# Patient Record
Sex: FEMALE | Race: WHITE | NOT HISPANIC OR LATINO | URBAN - METROPOLITAN AREA
[De-identification: names, ages, dates, MRNs, and addresses within clinical notes are randomized per-mention and may not be internally consistent; named-entity substitution may affect disease eponyms.]

---

## 2017-07-03 ENCOUNTER — EMERGENCY (EMERGENCY)
Facility: HOSPITAL | Age: 47
LOS: 1 days | Discharge: HOME | End: 2017-07-03
Attending: EMERGENCY MEDICINE
Payer: MEDICAID

## 2017-07-03 VITALS
OXYGEN SATURATION: 98 % | TEMPERATURE: 98 F | SYSTOLIC BLOOD PRESSURE: 107 MMHG | RESPIRATION RATE: 16 BRPM | HEIGHT: 63 IN | DIASTOLIC BLOOD PRESSURE: 57 MMHG | WEIGHT: 151.02 LBS | HEART RATE: 71 BPM
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
## 2017-07-03 DIAGNOSIS — Z86.79 PERSONAL HISTORY OF OTHER DISEASES OF THE CIRCULATORY SYSTEM: ICD-10-CM

## 2017-07-03 DIAGNOSIS — S70.11XA CONTUSION OF RIGHT THIGH, INITIAL ENCOUNTER: ICD-10-CM
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
## 2017-07-03 DIAGNOSIS — Z86.718 PERSONAL HISTORY OF OTHER VENOUS THROMBOSIS AND EMBOLISM: ICD-10-CM

## 2017-07-03 PROCEDURE — 99283 EMERGENCY DEPT VISIT LOW MDM: CPT | Mod: 25

## 2017-07-03 NOTE — ED PROVIDER NOTE - OBJECTIVE STATEMENT
45 y/o F with PMHx of DVT (due to being bedridden after being treated for peritonitis) presents to ED c/o sudden swelling to back of R thigh. Pt noticed the swelling and pain today, and on her way to ED, the lump began to look like a bruise in appearance. Pt notes history of varicose vein nearby, on inner R thigh, s/p vein ablation last year. Pt states that she has felt pain and burning here regularly. Denies any other swelling, calf pain, shortness of breath or any other complaints. NKDA.

## 2017-07-04 LAB
ALBUMIN SERPL ELPH-MCNC: 3.8 G/DL — SIGNIFICANT CHANGE UP (ref 3.5–5)
ALP SERPL-CCNC: 60 U/L — SIGNIFICANT CHANGE UP (ref 40–120)
ALT FLD-CCNC: 18 U/L DA — SIGNIFICANT CHANGE UP (ref 10–60)
ANION GAP SERPL CALC-SCNC: 5 MMOL/L — SIGNIFICANT CHANGE UP (ref 5–17)
APTT BLD: 31.2 SEC — SIGNIFICANT CHANGE UP (ref 27.5–37.4)
AST SERPL-CCNC: 13 U/L — SIGNIFICANT CHANGE UP (ref 10–40)
BILIRUB SERPL-MCNC: 0.3 MG/DL — SIGNIFICANT CHANGE UP (ref 0.2–1.2)
BUN SERPL-MCNC: 21 MG/DL — HIGH (ref 7–18)
CALCIUM SERPL-MCNC: 8.7 MG/DL — SIGNIFICANT CHANGE UP (ref 8.4–10.5)
CHLORIDE SERPL-SCNC: 104 MMOL/L — SIGNIFICANT CHANGE UP (ref 96–108)
CO2 SERPL-SCNC: 31 MMOL/L — SIGNIFICANT CHANGE UP (ref 22–31)
CREAT SERPL-MCNC: 0.84 MG/DL — SIGNIFICANT CHANGE UP (ref 0.5–1.3)
D DIMER BLD IA.RAPID-MCNC: <150 NG/ML DDU — SIGNIFICANT CHANGE UP
GLUCOSE SERPL-MCNC: 109 MG/DL — HIGH (ref 70–99)
HCG SERPL-ACNC: 3 MIU/ML — SIGNIFICANT CHANGE UP
HCT VFR BLD CALC: 37.9 % — SIGNIFICANT CHANGE UP (ref 34.5–45)
HGB BLD-MCNC: 12.6 G/DL — SIGNIFICANT CHANGE UP (ref 11.5–15.5)
INR BLD: 1.08 RATIO — SIGNIFICANT CHANGE UP (ref 0.88–1.16)
MCHC RBC-ENTMCNC: 31.5 PG — SIGNIFICANT CHANGE UP (ref 27–34)
MCHC RBC-ENTMCNC: 33.2 GM/DL — SIGNIFICANT CHANGE UP (ref 32–36)
MCV RBC AUTO: 94.9 FL — SIGNIFICANT CHANGE UP (ref 80–100)
PLATELET # BLD AUTO: 230 K/UL — SIGNIFICANT CHANGE UP (ref 150–400)
POTASSIUM SERPL-MCNC: 3.9 MMOL/L — SIGNIFICANT CHANGE UP (ref 3.5–5.3)
POTASSIUM SERPL-SCNC: 3.9 MMOL/L — SIGNIFICANT CHANGE UP (ref 3.5–5.3)
PROT SERPL-MCNC: 7.7 G/DL — SIGNIFICANT CHANGE UP (ref 6–8.3)
PROTHROM AB SERPL-ACNC: 11.8 SEC — SIGNIFICANT CHANGE UP (ref 9.8–12.7)
RBC # BLD: 3.99 M/UL — SIGNIFICANT CHANGE UP (ref 3.8–5.2)
RBC # FLD: 11.7 % — SIGNIFICANT CHANGE UP (ref 10.3–14.5)
SODIUM SERPL-SCNC: 140 MMOL/L — SIGNIFICANT CHANGE UP (ref 135–145)
WBC # BLD: 7.4 K/UL — SIGNIFICANT CHANGE UP (ref 3.8–10.5)
WBC # FLD AUTO: 7.4 K/UL — SIGNIFICANT CHANGE UP (ref 3.8–10.5)

## 2017-07-04 PROCEDURE — 85027 COMPLETE CBC AUTOMATED: CPT

## 2017-07-04 PROCEDURE — 80053 COMPREHEN METABOLIC PANEL: CPT

## 2017-07-04 PROCEDURE — 85610 PROTHROMBIN TIME: CPT

## 2017-07-04 PROCEDURE — 84702 CHORIONIC GONADOTROPIN TEST: CPT

## 2017-07-04 PROCEDURE — 85730 THROMBOPLASTIN TIME PARTIAL: CPT

## 2017-07-04 PROCEDURE — 85379 FIBRIN DEGRADATION QUANT: CPT

## 2017-07-04 PROCEDURE — 99283 EMERGENCY DEPT VISIT LOW MDM: CPT

## 2019-01-14 ENCOUNTER — EMERGENCY (EMERGENCY)
Facility: HOSPITAL | Age: 49
LOS: 1 days | Discharge: HOME | End: 2019-01-14
Attending: EMERGENCY MEDICINE
Payer: MEDICAID

## 2019-01-14 VITALS
TEMPERATURE: 98 F | OXYGEN SATURATION: 98 % | HEART RATE: 69 BPM | DIASTOLIC BLOOD PRESSURE: 70 MMHG | SYSTOLIC BLOOD PRESSURE: 108 MMHG | RESPIRATION RATE: 18 BRPM

## 2019-01-14 VITALS — HEIGHT: 63 IN | WEIGHT: 151.9 LBS

## 2019-01-14 DIAGNOSIS — Z90.49 ACQUIRED ABSENCE OF OTHER SPECIFIED PARTS OF DIGESTIVE TRACT: Chronic | ICD-10-CM

## 2019-01-14 PROCEDURE — 76641 ULTRASOUND BREAST COMPLETE: CPT

## 2019-01-14 PROCEDURE — 99284 EMERGENCY DEPT VISIT MOD MDM: CPT

## 2019-01-14 PROCEDURE — 76641 ULTRASOUND BREAST COMPLETE: CPT | Mod: 26,LT

## 2019-01-14 PROCEDURE — 99284 EMERGENCY DEPT VISIT MOD MDM: CPT | Mod: 25

## 2019-01-14 NOTE — ED PROVIDER NOTE - OBJECTIVE STATEMENT
49 y/o F with PMHx of acid reflux, peritonitis and PSHx of appendectomy presents to the ED with complaints of L breast pain x 2 days. Patient describes pain as sharp which is worsened with palpation and movement. Patient denies family history of cancer, fever, chills, sweats, discharge from the nipple, skin changes or any other acute complaints. NKDA.

## 2019-01-14 NOTE — ED ADULT NURSE NOTE - OBJECTIVE STATEMENT
pt is a 49 y/o female with c/o left breast pain  x 2 days pt states painful to the touch and breast feels swollen.

## 2019-01-14 NOTE — ED PROVIDER NOTE - MEDICAL DECISION MAKING DETAILS
49 y/o F presents with painful breast x 2 days. Will obtain ultrasound to rule out abscess and reassess.

## 2019-01-14 NOTE — ED ADULT NURSE NOTE - NSIMPLEMENTINTERV_GEN_ALL_ED
Implemented All Universal Safety Interventions:  [place] to call system. Call bell, personal items and telephone within reach. Instruct patient to call for assistance. Room bathroom lighting operational. Non-slip footwear when patient is off stretcher. Physically safe environment: no spills, clutter or unnecessary equipment. Stretcher in lowest position, wheels locked, appropriate side rails in place.

## 2019-01-14 NOTE — ED PROVIDER NOTE - PMH
Acid reflux    Peritonitis Unna Boot Text: An Unna boot was placed to help immobilize the limb and facilitate more rapid healing.

## 2019-01-14 NOTE — ED PROVIDER NOTE - PHYSICAL EXAMINATION
Chaperone: Karina Garcia RN.  Mild tenderness to palpation to right half of L breast. Particular tenderness at 12 o'clock position. No discharge. No skin changes. No erythema. No puckering. No peau d'orange.

## 2019-01-14 NOTE — ED PROVIDER NOTE - PROGRESS NOTE DETAILS
On exam, no signs of cellulitis. US shows multiple cysts. Will dc home with PMD or GYN follow up within 1 week and explained to patient that will need Mammogram or MRI of breast for further evaluation. Pt is well appearing walking with steady gait, stable for discharge and follow up without fail with medical doctor. I had a detailed discussion with the patient and/or guardian regarding the historical points, exam findings, and any diagnostic results supporting the discharge diagnosis. Pt educated on care and need for follow up. Strict return instructions and red flag signs and symptoms discussed with patient. Questions answered. Pt shows understanding of discharge information and agrees to follow.

## 2019-01-31 NOTE — ED PROVIDER NOTE - ATTESTATION, MLM
pls make 1 month f/u appt
I have reviewed and confirmed nurses' notes for patient's medications, allergies, medical history, and surgical history.

## 2021-06-25 NOTE — ED ADULT NURSE NOTE - PAIN RATING/NUMBER SCALE (0-10): ACTIVITY
Provided SBIRT services: Full screen Negative. Positive reinforcement provided given patient currently within healthy guidelines.
0

## 2021-07-06 ENCOUNTER — EMERGENCY (EMERGENCY)
Facility: HOSPITAL | Age: 51
LOS: 1 days | Discharge: HOME | End: 2021-07-06
Attending: EMERGENCY MEDICINE
Payer: MEDICAID

## 2021-07-06 VITALS
HEIGHT: 63 IN | WEIGHT: 139.99 LBS | TEMPERATURE: 98 F | RESPIRATION RATE: 17 BRPM | DIASTOLIC BLOOD PRESSURE: 86 MMHG | SYSTOLIC BLOOD PRESSURE: 146 MMHG | OXYGEN SATURATION: 98 % | HEART RATE: 89 BPM

## 2021-07-06 VITALS
OXYGEN SATURATION: 98 % | RESPIRATION RATE: 16 BRPM | DIASTOLIC BLOOD PRESSURE: 70 MMHG | TEMPERATURE: 98 F | HEART RATE: 76 BPM | SYSTOLIC BLOOD PRESSURE: 110 MMHG

## 2021-07-06 DIAGNOSIS — Z90.49 ACQUIRED ABSENCE OF OTHER SPECIFIED PARTS OF DIGESTIVE TRACT: Chronic | ICD-10-CM

## 2021-07-06 PROBLEM — K65.9 PERITONITIS, UNSPECIFIED: Chronic | Status: ACTIVE | Noted: 2019-01-14

## 2021-07-06 LAB
ALBUMIN SERPL ELPH-MCNC: 4.1 G/DL — SIGNIFICANT CHANGE UP (ref 3.5–5)
ALP SERPL-CCNC: 63 U/L — SIGNIFICANT CHANGE UP (ref 40–120)
ALT FLD-CCNC: 20 U/L DA — SIGNIFICANT CHANGE UP (ref 10–60)
ANION GAP SERPL CALC-SCNC: 7 MMOL/L — SIGNIFICANT CHANGE UP (ref 5–17)
APPEARANCE UR: CLEAR — SIGNIFICANT CHANGE UP
AST SERPL-CCNC: 12 U/L — SIGNIFICANT CHANGE UP (ref 10–40)
BASOPHILS # BLD AUTO: 0.02 K/UL — SIGNIFICANT CHANGE UP (ref 0–0.2)
BASOPHILS NFR BLD AUTO: 0.3 % — SIGNIFICANT CHANGE UP (ref 0–2)
BILIRUB SERPL-MCNC: 0.6 MG/DL — SIGNIFICANT CHANGE UP (ref 0.2–1.2)
BILIRUB UR-MCNC: NEGATIVE — SIGNIFICANT CHANGE UP
BUN SERPL-MCNC: 10 MG/DL — SIGNIFICANT CHANGE UP (ref 7–18)
CALCIUM SERPL-MCNC: 9.4 MG/DL — SIGNIFICANT CHANGE UP (ref 8.4–10.5)
CHLORIDE SERPL-SCNC: 107 MMOL/L — SIGNIFICANT CHANGE UP (ref 96–108)
CO2 SERPL-SCNC: 28 MMOL/L — SIGNIFICANT CHANGE UP (ref 22–31)
COLOR SPEC: YELLOW — SIGNIFICANT CHANGE UP
CREAT SERPL-MCNC: 0.72 MG/DL — SIGNIFICANT CHANGE UP (ref 0.5–1.3)
DIFF PNL FLD: NEGATIVE — SIGNIFICANT CHANGE UP
EOSINOPHIL # BLD AUTO: 0.15 K/UL — SIGNIFICANT CHANGE UP (ref 0–0.5)
EOSINOPHIL NFR BLD AUTO: 2.1 % — SIGNIFICANT CHANGE UP (ref 0–6)
GLUCOSE SERPL-MCNC: 110 MG/DL — HIGH (ref 70–99)
GLUCOSE UR QL: NEGATIVE — SIGNIFICANT CHANGE UP
HCG UR QL: NEGATIVE — SIGNIFICANT CHANGE UP
HCT VFR BLD CALC: 39.3 % — SIGNIFICANT CHANGE UP (ref 34.5–45)
HGB BLD-MCNC: 12.9 G/DL — SIGNIFICANT CHANGE UP (ref 11.5–15.5)
IMM GRANULOCYTES NFR BLD AUTO: 0.1 % — SIGNIFICANT CHANGE UP (ref 0–1.5)
KETONES UR-MCNC: NEGATIVE — SIGNIFICANT CHANGE UP
LEUKOCYTE ESTERASE UR-ACNC: NEGATIVE — SIGNIFICANT CHANGE UP
LYMPHOCYTES # BLD AUTO: 0.97 K/UL — LOW (ref 1–3.3)
LYMPHOCYTES # BLD AUTO: 13.7 % — SIGNIFICANT CHANGE UP (ref 13–44)
MAGNESIUM SERPL-MCNC: 2.4 MG/DL — SIGNIFICANT CHANGE UP (ref 1.6–2.6)
MCHC RBC-ENTMCNC: 30.5 PG — SIGNIFICANT CHANGE UP (ref 27–34)
MCHC RBC-ENTMCNC: 32.8 GM/DL — SIGNIFICANT CHANGE UP (ref 32–36)
MCV RBC AUTO: 92.9 FL — SIGNIFICANT CHANGE UP (ref 80–100)
MONOCYTES # BLD AUTO: 0.37 K/UL — SIGNIFICANT CHANGE UP (ref 0–0.9)
MONOCYTES NFR BLD AUTO: 5.2 % — SIGNIFICANT CHANGE UP (ref 2–14)
NEUTROPHILS # BLD AUTO: 5.58 K/UL — SIGNIFICANT CHANGE UP (ref 1.8–7.4)
NEUTROPHILS NFR BLD AUTO: 78.6 % — HIGH (ref 43–77)
NITRITE UR-MCNC: NEGATIVE — SIGNIFICANT CHANGE UP
NRBC # BLD: 0 /100 WBCS — SIGNIFICANT CHANGE UP (ref 0–0)
PH UR: 8 — SIGNIFICANT CHANGE UP (ref 5–8)
PLATELET # BLD AUTO: 284 K/UL — SIGNIFICANT CHANGE UP (ref 150–400)
POTASSIUM SERPL-MCNC: 4.2 MMOL/L — SIGNIFICANT CHANGE UP (ref 3.5–5.3)
POTASSIUM SERPL-SCNC: 4.2 MMOL/L — SIGNIFICANT CHANGE UP (ref 3.5–5.3)
PROT SERPL-MCNC: 8.2 G/DL — SIGNIFICANT CHANGE UP (ref 6–8.3)
PROT UR-MCNC: NEGATIVE — SIGNIFICANT CHANGE UP
RBC # BLD: 4.23 M/UL — SIGNIFICANT CHANGE UP (ref 3.8–5.2)
RBC # FLD: 11.9 % — SIGNIFICANT CHANGE UP (ref 10.3–14.5)
SODIUM SERPL-SCNC: 142 MMOL/L — SIGNIFICANT CHANGE UP (ref 135–145)
SP GR SPEC: 1.01 — SIGNIFICANT CHANGE UP (ref 1.01–1.02)
UROBILINOGEN FLD QL: NEGATIVE — SIGNIFICANT CHANGE UP
WBC # BLD: 7.1 K/UL — SIGNIFICANT CHANGE UP (ref 3.8–10.5)
WBC # FLD AUTO: 7.1 K/UL — SIGNIFICANT CHANGE UP (ref 3.8–10.5)

## 2021-07-06 PROCEDURE — 96361 HYDRATE IV INFUSION ADD-ON: CPT

## 2021-07-06 PROCEDURE — 85025 COMPLETE CBC W/AUTO DIFF WBC: CPT

## 2021-07-06 PROCEDURE — 81025 URINE PREGNANCY TEST: CPT

## 2021-07-06 PROCEDURE — 99283 EMERGENCY DEPT VISIT LOW MDM: CPT | Mod: 25

## 2021-07-06 PROCEDURE — 83735 ASSAY OF MAGNESIUM: CPT

## 2021-07-06 PROCEDURE — 81003 URINALYSIS AUTO W/O SCOPE: CPT

## 2021-07-06 PROCEDURE — 87086 URINE CULTURE/COLONY COUNT: CPT

## 2021-07-06 PROCEDURE — 99284 EMERGENCY DEPT VISIT MOD MDM: CPT

## 2021-07-06 PROCEDURE — 93005 ELECTROCARDIOGRAM TRACING: CPT

## 2021-07-06 PROCEDURE — 36415 COLL VENOUS BLD VENIPUNCTURE: CPT

## 2021-07-06 PROCEDURE — 80053 COMPREHEN METABOLIC PANEL: CPT

## 2021-07-06 PROCEDURE — 96360 HYDRATION IV INFUSION INIT: CPT

## 2021-07-06 PROCEDURE — 82962 GLUCOSE BLOOD TEST: CPT

## 2021-07-06 RX ORDER — SODIUM CHLORIDE 9 MG/ML
1000 INJECTION INTRAMUSCULAR; INTRAVENOUS; SUBCUTANEOUS ONCE
Refills: 0 | Status: COMPLETED | OUTPATIENT
Start: 2021-07-06 | End: 2021-07-06

## 2021-07-06 RX ADMIN — SODIUM CHLORIDE 2000 MILLILITER(S): 9 INJECTION INTRAMUSCULAR; INTRAVENOUS; SUBCUTANEOUS at 09:19

## 2021-07-06 RX ADMIN — SODIUM CHLORIDE 1000 MILLILITER(S): 9 INJECTION INTRAMUSCULAR; INTRAVENOUS; SUBCUTANEOUS at 12:00

## 2021-07-06 NOTE — ED PROVIDER NOTE - PHYSICAL EXAMINATION
Afebrile, hemodynamically stable, saturating well  NAD, well appearing, sitting comfortably in bed, no WOB, speaking full sentences  Head NCAT  PERRL, EOMI, anicteric  MMM  No JVD  RRR, nml S1/S2, no m/r/g  Lungs CTAB, no w/r/r  Abd soft, NT, ND, nml BS, no rebound or guarding  AAO, CN's 3-12 intact, motor 5/5 and sens symm in all extrems, F-N nml  SCHULTZ spontaneously, no leg cyanosis or edema, no tremors  Skin warm, well perfused, no rashes or hives

## 2021-07-06 NOTE — ED PROVIDER NOTE - PATIENT PORTAL LINK FT
You can access the FollowMyHealth Patient Portal offered by [facility] by registering at the following website: [email or web address]. By joining [employer]’s FollowMyHealth portal, you will also be able to view your health information using other applications (apps) compatible with our system.

## 2021-07-06 NOTE — ED PROVIDER NOTE - NSFOLLOWUPINSTRUCTIONS_ED_ALL_ED_FT
Please follow up with your primary care doctor and neurologist in 1-2 days.  Please keep well hydrated.  Please return to the emergency department if you have worsening headache, blurry vision, slurred speech, weakness, vomiting, fever, or any other symptoms.      Tremors    WHAT YOU NEED TO KNOW:    A tremor is a movement you cannot control that occurs in a rhythm. Tremors most commonly occur in the hands. Other common places include the head or face, trunk, or legs. Your voice can also have a tremor and sound shaky when you speak. A tremor may be caused by a nerve problem, too much thyroid hormone, or by certain medicines, caffeine, or alcohol. Tremors may be temporary or permanent. The tremor may go away and return, or worsen with stress. Tremors can happen at any age, but they are more common in later years.    DISCHARGE INSTRUCTIONS:    Contact your healthcare provider if:   •You have a new or worsening tremor.  •You have tremors that make it difficult to do your regular activities.  •You have questions or concerns about your condition or care.    Medicines:   •Medicines may be used to help control some kinds of tremors.  •Take your medicine as directed. Contact your healthcare provider if you think your medicine is not helping or if you have side effects. Tell him or her if you are allergic to any medicine. Keep a list of the medicines, vitamins, and herbs you take. Include the amounts, and when and why you take them. Bring the list or the pill bottles to follow-up visits. Carry your medicine list with you in case of an emergency.    Manage your symptoms:   •Do not have caffeine or other chemicals that affect your nerves. Limit or do not have caffeine. Caffeine can make tremors worse. Talk to your healthcare provider about herbal medicines, teas, and supplements. They may also increase tremors. Do not use illegal drugs.  •Go to physical and occupational therapy as directed. A physical therapist can teach you exercises to help reduce the tremor and improve muscle control. You may be shown how to hold the body part during a tremor to help control the movement. The therapist can also help you build strength and balance. An occupational therapist can show you how to use adaptive devices to help you move more easily.   •Use objects that will help you control movements. You may have more hand control if you add a watch or bracelet to your wrist. It may be easier for you to drink from a straw, or to fill your cup only half full. Cups with lids, such as travel mugs, can also help you drink with more control. Heavy eating utensils can help you eat more easily. A button fastener can help you button clothing if tremors in your hands make this difficult.  •Set a regular sleep schedule. Lack of sleep can make tremors worse. Try to go to bed at the same time each night and wake up at the same time each morning.    Follow up with your healthcare provider as directed: Write down your questions so you remember to ask them during your visits.

## 2021-07-06 NOTE — ED PROVIDER NOTE - OBJECTIVE STATEMENT
50yoF with h/o CVA in May with L eye blurriness, on Plavix and statin, remote provoked DVT during appy hospitalization, no other meds, and was sent home from her CVA with VNS and PT for unsteadiness, presents with anxiety feeling. States awoke with 330AM today with feeling anxious, shaky in her body, dry mouth, and BP was 97/55 which she was worried about, the BP danica with time to 138/66 and this worried her even more. She also had some puffiness of her L eyelid which she was worried about and has resolved. Has had intermittent feeling of blurriness in her L eye since her CVA, none currently. States she thinks this may be 2/2 being anxious over her recent CVA. Denies alcohol and drug use. COVID 2nd vacc in April. Denies HA, dizziness, u/l weakness or numbness, CP, SOB, leg pain or swelling, v/d, fever, and all other symptoms.

## 2021-07-06 NOTE — ED PROVIDER NOTE - CLINICAL SUMMARY MEDICAL DECISION MAKING FREE TEXT BOX
No eye ptosis, no blurry vision, no EOMI issues. No focal or localizing findings to suggest CVA. Character low suspicion for PE and no e/o DVT or acute risk factors for this. Character low suspicion for ACS and ECG unremarkable. No arrhythmia. No e/o aortic outflow obstruction. No anemia or gross electrolyte abnormalities. ______________ No fever or specific infectious symptoms _______________. Denies alcohol use and no signs of withdrawal. No eye ptosis, no blurry vision, no EOMI issues. No focal or localizing findings to suggest CVA. Character low suspicion for PE and no e/o DVT or acute risk factors for this. Character low suspicion for ACS and ECG unremarkable. No arrhythmia. No e/o aortic outflow obstruction. No anemia or gross electrolyte abnormalities. No fever or specific infectious symptoms. Denies alcohol use and no signs of withdrawal. Pt reports anxiety feeling and is very aware of her symptoms and symptoms could conceivably be 2/2 this. Patient is well appearing, NAD, afebrile, hemodynamically stable. Any available tests and studies were discussed with patient and . Discharged with instructions in further symptomatic care, return precautions, and need for PMD and neuro f/u.

## 2021-07-06 NOTE — ED ADULT NURSE NOTE - CAS ELECT INFOMATION PROVIDED
----- Message from Shante Cohen MD sent at 3/3/2019  8:50 PM CST -----  Please call patient.  Several of her electrolytes are abnormal, likely as a result of her recent high sugars.  Before adding any treatment, I'd like her to return to clinic to have her tests repeated.  She may do so at a lab only visit.   DC instructions

## 2021-07-07 LAB
CULTURE RESULTS: NO GROWTH — SIGNIFICANT CHANGE UP
SPECIMEN SOURCE: SIGNIFICANT CHANGE UP

## 2021-07-12 PROBLEM — Z00.00 ENCOUNTER FOR PREVENTIVE HEALTH EXAMINATION: Status: ACTIVE | Noted: 2021-07-12

## 2021-07-19 ENCOUNTER — APPOINTMENT (OUTPATIENT)
Dept: VASCULAR SURGERY | Facility: CLINIC | Age: 51
End: 2021-07-19
Payer: MEDICAID

## 2021-07-19 VITALS
TEMPERATURE: 97.1 F | DIASTOLIC BLOOD PRESSURE: 72 MMHG | HEART RATE: 90 BPM | SYSTOLIC BLOOD PRESSURE: 111 MMHG | BODY MASS INDEX: 25.34 KG/M2 | WEIGHT: 143 LBS | HEIGHT: 63 IN

## 2021-07-19 VITALS — SYSTOLIC BLOOD PRESSURE: 110 MMHG | HEART RATE: 89 BPM | DIASTOLIC BLOOD PRESSURE: 70 MMHG

## 2021-07-19 DIAGNOSIS — I63.9 CEREBRAL INFARCTION, UNSPECIFIED: ICD-10-CM

## 2021-07-19 PROCEDURE — 99204 OFFICE O/P NEW MOD 45 MIN: CPT

## 2021-07-19 PROCEDURE — 93880 EXTRACRANIAL BILAT STUDY: CPT

## 2022-01-01 NOTE — ED ADULT NURSE NOTE - NS PRO AD NO ADVANCE DIRECTIVE

## 2022-05-07 ENCOUNTER — EMERGENCY (EMERGENCY)
Facility: HOSPITAL | Age: 52
LOS: 1 days | Discharge: HOME | End: 2022-05-07
Attending: EMERGENCY MEDICINE
Payer: MEDICAID

## 2022-05-07 VITALS
OXYGEN SATURATION: 100 % | RESPIRATION RATE: 17 BRPM | SYSTOLIC BLOOD PRESSURE: 104 MMHG | TEMPERATURE: 98 F | DIASTOLIC BLOOD PRESSURE: 64 MMHG | HEART RATE: 66 BPM

## 2022-05-07 VITALS
HEART RATE: 83 BPM | SYSTOLIC BLOOD PRESSURE: 123 MMHG | TEMPERATURE: 98 F | DIASTOLIC BLOOD PRESSURE: 70 MMHG | WEIGHT: 141.1 LBS | HEIGHT: 63 IN | RESPIRATION RATE: 17 BRPM | OXYGEN SATURATION: 99 %

## 2022-05-07 DIAGNOSIS — Z90.49 ACQUIRED ABSENCE OF OTHER SPECIFIED PARTS OF DIGESTIVE TRACT: Chronic | ICD-10-CM

## 2022-05-07 LAB
ALBUMIN SERPL ELPH-MCNC: 4.1 G/DL — SIGNIFICANT CHANGE UP (ref 3.5–5)
ALP SERPL-CCNC: 62 U/L — SIGNIFICANT CHANGE UP (ref 40–120)
ALT FLD-CCNC: 40 U/L DA — SIGNIFICANT CHANGE UP (ref 10–60)
ANION GAP SERPL CALC-SCNC: 3 MMOL/L — LOW (ref 5–17)
AST SERPL-CCNC: 19 U/L — SIGNIFICANT CHANGE UP (ref 10–40)
BASOPHILS # BLD AUTO: 0.01 K/UL — SIGNIFICANT CHANGE UP (ref 0–0.2)
BASOPHILS NFR BLD AUTO: 0.2 % — SIGNIFICANT CHANGE UP (ref 0–2)
BILIRUB SERPL-MCNC: 0.6 MG/DL — SIGNIFICANT CHANGE UP (ref 0.2–1.2)
BUN SERPL-MCNC: 13 MG/DL — SIGNIFICANT CHANGE UP (ref 7–18)
CALCIUM SERPL-MCNC: 9.3 MG/DL — SIGNIFICANT CHANGE UP (ref 8.4–10.5)
CHLORIDE SERPL-SCNC: 105 MMOL/L — SIGNIFICANT CHANGE UP (ref 96–108)
CO2 SERPL-SCNC: 30 MMOL/L — SIGNIFICANT CHANGE UP (ref 22–31)
CREAT SERPL-MCNC: 0.81 MG/DL — SIGNIFICANT CHANGE UP (ref 0.5–1.3)
EGFR: 87 ML/MIN/1.73M2 — SIGNIFICANT CHANGE UP
EOSINOPHIL # BLD AUTO: 0.06 K/UL — SIGNIFICANT CHANGE UP (ref 0–0.5)
EOSINOPHIL NFR BLD AUTO: 1.1 % — SIGNIFICANT CHANGE UP (ref 0–6)
GLUCOSE SERPL-MCNC: 143 MG/DL — HIGH (ref 70–99)
HCG SERPL-ACNC: 2 MIU/ML — SIGNIFICANT CHANGE UP
HCT VFR BLD CALC: 40.3 % — SIGNIFICANT CHANGE UP (ref 34.5–45)
HGB BLD-MCNC: 13.2 G/DL — SIGNIFICANT CHANGE UP (ref 11.5–15.5)
IMM GRANULOCYTES NFR BLD AUTO: 0.2 % — SIGNIFICANT CHANGE UP (ref 0–1.5)
LYMPHOCYTES # BLD AUTO: 1.43 K/UL — SIGNIFICANT CHANGE UP (ref 1–3.3)
LYMPHOCYTES # BLD AUTO: 26.6 % — SIGNIFICANT CHANGE UP (ref 13–44)
MCHC RBC-ENTMCNC: 30.5 PG — SIGNIFICANT CHANGE UP (ref 27–34)
MCHC RBC-ENTMCNC: 32.8 GM/DL — SIGNIFICANT CHANGE UP (ref 32–36)
MCV RBC AUTO: 93.1 FL — SIGNIFICANT CHANGE UP (ref 80–100)
MONOCYTES # BLD AUTO: 0.36 K/UL — SIGNIFICANT CHANGE UP (ref 0–0.9)
MONOCYTES NFR BLD AUTO: 6.7 % — SIGNIFICANT CHANGE UP (ref 2–14)
NEUTROPHILS # BLD AUTO: 3.51 K/UL — SIGNIFICANT CHANGE UP (ref 1.8–7.4)
NEUTROPHILS NFR BLD AUTO: 65.2 % — SIGNIFICANT CHANGE UP (ref 43–77)
NRBC # BLD: 0 /100 WBCS — SIGNIFICANT CHANGE UP (ref 0–0)
PLATELET # BLD AUTO: 272 K/UL — SIGNIFICANT CHANGE UP (ref 150–400)
POTASSIUM SERPL-MCNC: 3.5 MMOL/L — SIGNIFICANT CHANGE UP (ref 3.5–5.3)
POTASSIUM SERPL-SCNC: 3.5 MMOL/L — SIGNIFICANT CHANGE UP (ref 3.5–5.3)
PROT SERPL-MCNC: 7.9 G/DL — SIGNIFICANT CHANGE UP (ref 6–8.3)
RBC # BLD: 4.33 M/UL — SIGNIFICANT CHANGE UP (ref 3.8–5.2)
RBC # FLD: 11.9 % — SIGNIFICANT CHANGE UP (ref 10.3–14.5)
SODIUM SERPL-SCNC: 138 MMOL/L — SIGNIFICANT CHANGE UP (ref 135–145)
TROPONIN I, HIGH SENSITIVITY RESULT: 3.7 NG/L — SIGNIFICANT CHANGE UP
WBC # BLD: 5.38 K/UL — SIGNIFICANT CHANGE UP (ref 3.8–10.5)
WBC # FLD AUTO: 5.38 K/UL — SIGNIFICANT CHANGE UP (ref 3.8–10.5)

## 2022-05-07 PROCEDURE — 70450 CT HEAD/BRAIN W/O DYE: CPT | Mod: 26,59,MA

## 2022-05-07 PROCEDURE — 80053 COMPREHEN METABOLIC PANEL: CPT

## 2022-05-07 PROCEDURE — 99285 EMERGENCY DEPT VISIT HI MDM: CPT

## 2022-05-07 PROCEDURE — 70496 CT ANGIOGRAPHY HEAD: CPT | Mod: MA

## 2022-05-07 PROCEDURE — 85025 COMPLETE CBC W/AUTO DIFF WBC: CPT

## 2022-05-07 PROCEDURE — 36415 COLL VENOUS BLD VENIPUNCTURE: CPT

## 2022-05-07 PROCEDURE — 71045 X-RAY EXAM CHEST 1 VIEW: CPT

## 2022-05-07 PROCEDURE — 70498 CT ANGIOGRAPHY NECK: CPT | Mod: 26,MA

## 2022-05-07 PROCEDURE — 70450 CT HEAD/BRAIN W/O DYE: CPT | Mod: MA

## 2022-05-07 PROCEDURE — 70496 CT ANGIOGRAPHY HEAD: CPT | Mod: 26,MA

## 2022-05-07 PROCEDURE — 71045 X-RAY EXAM CHEST 1 VIEW: CPT | Mod: 26

## 2022-05-07 PROCEDURE — 99284 EMERGENCY DEPT VISIT MOD MDM: CPT | Mod: 25

## 2022-05-07 PROCEDURE — 84702 CHORIONIC GONADOTROPIN TEST: CPT

## 2022-05-07 PROCEDURE — 84484 ASSAY OF TROPONIN QUANT: CPT

## 2022-05-07 PROCEDURE — 82962 GLUCOSE BLOOD TEST: CPT

## 2022-05-07 PROCEDURE — 70498 CT ANGIOGRAPHY NECK: CPT | Mod: MA

## 2022-05-07 RX ADMIN — Medication 2 MILLIGRAM(S): at 17:28

## 2022-05-07 NOTE — ED PROVIDER NOTE - PATIENT PORTAL LINK FT
You can access the FollowMyHealth Patient Portal offered by [facility] by registering at the following website: [email or web address]. By joining [employer]’s FollowMyHealth portal, you will also be able to view your health information using other applications (apps) compatible with our system.

## 2022-05-07 NOTE — ED PROVIDER NOTE - NSFOLLOWUPINSTRUCTIONS_ED_ALL_ED_FT
You were seen in the emergency department for: numbness  Your results report is attached.  You may be contacted by the Emergency Department Referrals Coordinator to set up your follow-up appointment within 24-48 hours of your discharge, Monday to Friday. We recommend you follow up with: Neurology    Please return to the Emergency Department if you experience any of the following symptoms:   - Shortness of breath or trouble breathing  - Pressure, pain or tightness in the chest  - Face drooping, arm weakness or speech difficulty  - Persistence of severe vomiting  - Head injury or loss of consciousness  - Nonstop bleeding or an open wound    (1) Follow up with your primary care physician within the next 24-48 hours as discussed. In addition, we did not find evidence of a life threatening illness on your testing here today, but listed below are the specialists that will be necessary to see as an outpatient to continue the workup.  Please call the numbers listed below or [phone] to set up the necessary appointments.  (2) Take Tylenol (up to 1000mg or 1 g)  and/or Motrin (up to 600mg) up to every 6 hours as needed for pain.   (3) If you had an IV (intravenous) line placed, it was removed. Sometimes, after IV removal, that area can be tender for a few days; if it develops redness and swelling, those could be signs of infection; in which case, return to the Emergency Department for assessment.  (4) Please continue taking all of your home medications as directed.

## 2022-05-07 NOTE — ED PROVIDER NOTE - CONSTITUTIONAL, MLM
normal... awake, alert, oriented to person, place, time/situation and in no apparent distress. tearful, trembling

## 2022-05-07 NOTE — ED PROVIDER NOTE - PROGRESS NOTE DETAILS
Yen: pt walking with steady gait. work up neg. ct head no sign of prior infarct. pt states 1 yr ago at [facility] was told right occipital with large clot and infarcted area which causes intermitted visual changes of left eye and tingling on and off. clinically odd to be a cva- asking for cta. Yen: Yen: pending cta. s/o to dr Lpoez- if work up neg - I recommend dc CTA Neck: No hemodynamically significant stenosis by NASCET criteria,   dissection, or pseudoaneurysm.    CTA Head: No large vessel occlusion, significant stenosis, dissection, or   saccular aneurysm.    Patient appears and feels well, will discharge with recs to follow up with Neurology.

## 2022-05-07 NOTE — ED ADULT NURSE NOTE - OBJECTIVE STATEMENT
axox3 ,nad , headache since 7 am today and R facial numbness for 1 hrs , left arm for numbness x 1 , dizziness x 1

## 2022-05-07 NOTE — ED PROVIDER NOTE - CLINICAL SUMMARY MEDICAL DECISION MAKING FREE TEXT BOX
51 yr old female with h/o CVA in May with L eye blurriness, on Plavix and statin, remote provoked DVT during appy hospitalization, no other meds, and was sent home from her CVA with VNS and PT for unsteadiness, presents to ed c/o left eye blurriness, right face numbness, dizziness and feeling very anxious over the symptoms. pt developed sudden onset of vertigo and then rest of symptoms started. no drug use, no syncope, no cp.    anxiety vs peripheral vertigo- labs, ct head, ativan, re-assess

## 2022-05-07 NOTE — ED PROVIDER NOTE - NEUROLOGICAL, MLM
Alert and oriented, no focal deficits, no motor or sensory deficits. CN II-XII intact, no dysmetria, strength 5/5

## 2022-05-07 NOTE — ED PROVIDER NOTE - OBJECTIVE STATEMENT
51 yr old female with h/o CVA in May with L eye blurriness, on Plavix and statin, remote provoked DVT during appy hospitalization, no other meds, and was sent home from her CVA with VNS and PT for unsteadiness, presents to ed c/o left eye blurriness, right face numbness, dizziness and feeling very anxious over the symptoms. pt developed sudden onset of vertigo and then rest of symptoms started. no drug use, no syncope, no cp.

## 2022-11-29 ENCOUNTER — APPOINTMENT (OUTPATIENT)
Dept: GASTROENTEROLOGY | Facility: CLINIC | Age: 52
End: 2022-11-29

## 2023-02-06 ENCOUNTER — APPOINTMENT (OUTPATIENT)
Dept: GASTROENTEROLOGY | Facility: CLINIC | Age: 53
End: 2023-02-06
Payer: MEDICAID

## 2023-02-06 VITALS
DIASTOLIC BLOOD PRESSURE: 81 MMHG | HEART RATE: 83 BPM | WEIGHT: 133 LBS | BODY MASS INDEX: 23.57 KG/M2 | OXYGEN SATURATION: 95 % | HEIGHT: 63 IN | RESPIRATION RATE: 14 BRPM | SYSTOLIC BLOOD PRESSURE: 133 MMHG | TEMPERATURE: 97.5 F

## 2023-02-06 DIAGNOSIS — R10.9 UNSPECIFIED ABDOMINAL PAIN: ICD-10-CM

## 2023-02-06 DIAGNOSIS — Z78.9 OTHER SPECIFIED HEALTH STATUS: ICD-10-CM

## 2023-02-06 DIAGNOSIS — Z87.19 PERSONAL HISTORY OF OTHER DISEASES OF THE DIGESTIVE SYSTEM: ICD-10-CM

## 2023-02-06 DIAGNOSIS — Z86.718 PERSONAL HISTORY OF OTHER VENOUS THROMBOSIS AND EMBOLISM: ICD-10-CM

## 2023-02-06 DIAGNOSIS — K21.9 GASTRO-ESOPHAGEAL REFLUX DISEASE W/OUT ESOPHAGITIS: ICD-10-CM

## 2023-02-06 DIAGNOSIS — R12 HEARTBURN: ICD-10-CM

## 2023-02-06 PROCEDURE — 99204 OFFICE O/P NEW MOD 45 MIN: CPT

## 2023-02-06 RX ORDER — ATORVASTATIN CALCIUM 80 MG/1
TABLET, FILM COATED ORAL
Refills: 0 | Status: ACTIVE | COMMUNITY

## 2023-02-06 RX ORDER — CLOPIDOGREL 75 MG/1
TABLET, FILM COATED ORAL
Refills: 0 | Status: ACTIVE | COMMUNITY

## 2023-02-06 NOTE — HISTORY OF PRESENT ILLNESS
[FreeTextEntry1] : 52 year old female presents with complaints of epigastric abdominal pain. Pain ongoing for the last month. She states she has a hx of abdominal problems. She was told in 2019 she had appendicitis and peritonitis. She had a CT scan of the abdomen done in 2021 and was told she did not have a appendix she is not sure if this is completely accurate. Last Colon and EGD was 2018. Colon was normal and EGD revealed 2 ulcers she was told to only take Omeprazole 20 mg PO 2 times per day. She has a family hx of Colon Cancer from her father. She has also noticed an increase amount of gas and abdominal bloating along with nausea. She has a hx of CVA from May 2021 she is on Plavix and has a loop recorder. Her Cardiologist is Dr Milligan and PCP Dr Dalton. Bowel movements are daily and regular. Denies rectal bleeding, blood in the stool, melena or hematemesis.

## 2023-02-06 NOTE — PHYSICAL EXAM
[Alert] : alert [Normal Voice/Communication] : normal voice/communication [Healthy Appearing] : healthy appearing [No Acute Distress] : no acute distress [Sclera] : the sclera and conjunctiva were normal [Hearing Threshold Finger Rub Not Meagher] : hearing was normal [Normal Lips/Gums] : the lips and gums were normal [Oropharynx] : the oropharynx was normal [Normal Appearance] : the appearance of the neck was normal [No Neck Mass] : no neck mass was observed [No Respiratory Distress] : no respiratory distress [No Acc Muscle Use] : no accessory muscle use [Respiration, Rhythm And Depth] : normal respiratory rhythm and effort [Auscultation Breath Sounds / Voice Sounds] : lungs were clear to auscultation bilaterally [Heart Rate And Rhythm] : heart rate was normal and rhythm regular [Normal S1, S2] : normal S1 and S2 [Murmurs] : no murmurs [Bowel Sounds] : normal bowel sounds [No Masses] : no abdominal mass palpated [Abdomen Soft] : soft [] : no hepatosplenomegaly [Epigastric] : epigastric [Oriented To Time, Place, And Person] : oriented to person, place, and time

## 2023-02-06 NOTE — ASSESSMENT
[FreeTextEntry1] : Attending Attestation \par \par A low acid / reflux diet was discussed in great detail including not smoking, not drinking alcohol, and not\par consuming foods that irritate the esophagus. It is helpful to eat small meals throughout the day instead\par of large meals. You should avoid eating before bedtime or lying down after you eat. It can be helpful to\par raise the head of your bed six inches. Additionally, you should maintain a healthy weight and good\par posture.. The patient was given written material to take home and review. \par \par Patient will begin taking Pantoprazole 40 mg PO in the AM and Famotidine 20 mg PO in the PM. Medications reviewed side effects and adverse reactions \par \par Ct Scan of the Abdomen and Pelvis The risks benefits alternatives and complications of the procedure/s were explained to the patient at length. The patient was agreeable and we will proceed. \par \par \par Blood work ordered to be drawn\par FOBT test ordered instructions provided to patient how to obtain sample and where to return specimen. \par \par Patient verbalized understanding of all information provided. All questions answered and reviewed.\par \par I spent 45 minutes with the patient as well as reviewing documents prior to and after the office visit\par  0

## 2023-02-07 ENCOUNTER — NON-APPOINTMENT (OUTPATIENT)
Age: 53
End: 2023-02-07

## 2023-02-07 LAB
ALBUMIN SERPL ELPH-MCNC: 4.3 G/DL
ALP BLD-CCNC: 59 U/L
ALT SERPL-CCNC: 14 U/L
AMYLASE/CREAT SERPL: 57 U/L
ANION GAP SERPL CALC-SCNC: 12 MMOL/L
AST SERPL-CCNC: 17 U/L
BASOPHILS # BLD AUTO: 0.02 K/UL
BASOPHILS NFR BLD AUTO: 0.3 %
BILIRUB SERPL-MCNC: 0.5 MG/DL
BUN SERPL-MCNC: 15 MG/DL
CALCIUM SERPL-MCNC: 9.5 MG/DL
CHLORIDE SERPL-SCNC: 105 MMOL/L
CO2 SERPL-SCNC: 25 MMOL/L
CREAT SERPL-MCNC: 0.76 MG/DL
EGFR: 94 ML/MIN/1.73M2
EOSINOPHIL # BLD AUTO: 0.05 K/UL
EOSINOPHIL NFR BLD AUTO: 0.7 %
GLUCOSE SERPL-MCNC: 118 MG/DL
HCT VFR BLD CALC: 41.9 %
HGB BLD-MCNC: 13 G/DL
IMM GRANULOCYTES NFR BLD AUTO: 0.1 %
LPL SERPL-CCNC: 40 U/L
LYMPHOCYTES # BLD AUTO: 1.25 K/UL
LYMPHOCYTES NFR BLD AUTO: 16.9 %
MAN DIFF?: NORMAL
MCHC RBC-ENTMCNC: 30.4 PG
MCHC RBC-ENTMCNC: 31 GM/DL
MCV RBC AUTO: 98.1 FL
MONOCYTES # BLD AUTO: 0.45 K/UL
MONOCYTES NFR BLD AUTO: 6.1 %
NEUTROPHILS # BLD AUTO: 5.6 K/UL
NEUTROPHILS NFR BLD AUTO: 75.9 %
PLATELET # BLD AUTO: 289 K/UL
POTASSIUM SERPL-SCNC: 3.4 MMOL/L
PROT SERPL-MCNC: 6.9 G/DL
RBC # BLD: 4.27 M/UL
RBC # FLD: 12.7 %
SODIUM SERPL-SCNC: 142 MMOL/L
WBC # FLD AUTO: 7.38 K/UL

## 2023-02-11 ENCOUNTER — EMERGENCY (EMERGENCY)
Facility: HOSPITAL | Age: 53
LOS: 1 days | Discharge: HOME | End: 2023-02-11
Attending: STUDENT IN AN ORGANIZED HEALTH CARE EDUCATION/TRAINING PROGRAM | Admitting: STUDENT IN AN ORGANIZED HEALTH CARE EDUCATION/TRAINING PROGRAM
Payer: MEDICAID

## 2023-02-11 VITALS
DIASTOLIC BLOOD PRESSURE: 87 MMHG | TEMPERATURE: 98 F | RESPIRATION RATE: 18 BRPM | SYSTOLIC BLOOD PRESSURE: 116 MMHG | HEART RATE: 92 BPM | OXYGEN SATURATION: 99 %

## 2023-02-11 VITALS
RESPIRATION RATE: 17 BRPM | TEMPERATURE: 98 F | OXYGEN SATURATION: 99 % | SYSTOLIC BLOOD PRESSURE: 137 MMHG | HEART RATE: 81 BPM | DIASTOLIC BLOOD PRESSURE: 73 MMHG

## 2023-02-11 DIAGNOSIS — Z90.49 ACQUIRED ABSENCE OF OTHER SPECIFIED PARTS OF DIGESTIVE TRACT: Chronic | ICD-10-CM

## 2023-02-11 LAB
ALBUMIN SERPL ELPH-MCNC: 4.5 G/DL — SIGNIFICANT CHANGE UP (ref 3.3–5)
ALP SERPL-CCNC: 57 U/L — SIGNIFICANT CHANGE UP (ref 40–120)
ALT FLD-CCNC: 8 U/L — SIGNIFICANT CHANGE UP (ref 4–33)
ANION GAP SERPL CALC-SCNC: 9 MMOL/L — SIGNIFICANT CHANGE UP (ref 7–14)
APPEARANCE UR: CLEAR — SIGNIFICANT CHANGE UP
APTT BLD: 29.3 SEC — SIGNIFICANT CHANGE UP (ref 27–36.3)
AST SERPL-CCNC: 14 U/L — SIGNIFICANT CHANGE UP (ref 4–32)
BASOPHILS # BLD AUTO: 0.02 K/UL — SIGNIFICANT CHANGE UP (ref 0–0.2)
BASOPHILS NFR BLD AUTO: 0.4 % — SIGNIFICANT CHANGE UP (ref 0–2)
BILIRUB SERPL-MCNC: 0.8 MG/DL — SIGNIFICANT CHANGE UP (ref 0.2–1.2)
BILIRUB UR-MCNC: NEGATIVE — SIGNIFICANT CHANGE UP
BLOOD GAS VENOUS COMPREHENSIVE RESULT: SIGNIFICANT CHANGE UP
BUN SERPL-MCNC: 14 MG/DL — SIGNIFICANT CHANGE UP (ref 7–23)
CALCIUM SERPL-MCNC: 9.6 MG/DL — SIGNIFICANT CHANGE UP (ref 8.4–10.5)
CHLORIDE SERPL-SCNC: 105 MMOL/L — SIGNIFICANT CHANGE UP (ref 98–107)
CO2 SERPL-SCNC: 28 MMOL/L — SIGNIFICANT CHANGE UP (ref 22–31)
COLOR SPEC: SIGNIFICANT CHANGE UP
CREAT SERPL-MCNC: 0.75 MG/DL — SIGNIFICANT CHANGE UP (ref 0.5–1.3)
DIFF PNL FLD: NEGATIVE — SIGNIFICANT CHANGE UP
EGFR: 95 ML/MIN/1.73M2 — SIGNIFICANT CHANGE UP
EOSINOPHIL # BLD AUTO: 0.07 K/UL — SIGNIFICANT CHANGE UP (ref 0–0.5)
EOSINOPHIL NFR BLD AUTO: 1.4 % — SIGNIFICANT CHANGE UP (ref 0–6)
FLUAV AG NPH QL: SIGNIFICANT CHANGE UP
FLUBV AG NPH QL: SIGNIFICANT CHANGE UP
GLUCOSE SERPL-MCNC: 104 MG/DL — HIGH (ref 70–99)
GLUCOSE UR QL: NEGATIVE — SIGNIFICANT CHANGE UP
HCT VFR BLD CALC: 40.6 % — SIGNIFICANT CHANGE UP (ref 34.5–45)
HGB BLD-MCNC: 13.2 G/DL — SIGNIFICANT CHANGE UP (ref 11.5–15.5)
IANC: 3.16 K/UL — SIGNIFICANT CHANGE UP (ref 1.8–7.4)
IMM GRANULOCYTES NFR BLD AUTO: 0.2 % — SIGNIFICANT CHANGE UP (ref 0–0.9)
INR BLD: 1.1 RATIO — SIGNIFICANT CHANGE UP (ref 0.88–1.16)
KETONES UR-MCNC: NEGATIVE — SIGNIFICANT CHANGE UP
LEUKOCYTE ESTERASE UR-ACNC: NEGATIVE — SIGNIFICANT CHANGE UP
LYMPHOCYTES # BLD AUTO: 1.5 K/UL — SIGNIFICANT CHANGE UP (ref 1–3.3)
LYMPHOCYTES # BLD AUTO: 29.1 % — SIGNIFICANT CHANGE UP (ref 13–44)
MCHC RBC-ENTMCNC: 30.6 PG — SIGNIFICANT CHANGE UP (ref 27–34)
MCHC RBC-ENTMCNC: 32.5 GM/DL — SIGNIFICANT CHANGE UP (ref 32–36)
MCV RBC AUTO: 94 FL — SIGNIFICANT CHANGE UP (ref 80–100)
MONOCYTES # BLD AUTO: 0.4 K/UL — SIGNIFICANT CHANGE UP (ref 0–0.9)
MONOCYTES NFR BLD AUTO: 7.8 % — SIGNIFICANT CHANGE UP (ref 2–14)
NEUTROPHILS # BLD AUTO: 3.16 K/UL — SIGNIFICANT CHANGE UP (ref 1.8–7.4)
NEUTROPHILS NFR BLD AUTO: 61.1 % — SIGNIFICANT CHANGE UP (ref 43–77)
NITRITE UR-MCNC: NEGATIVE — SIGNIFICANT CHANGE UP
NRBC # BLD: 0 /100 WBCS — SIGNIFICANT CHANGE UP (ref 0–0)
NRBC # FLD: 0 K/UL — SIGNIFICANT CHANGE UP (ref 0–0)
PH UR: 7 — SIGNIFICANT CHANGE UP (ref 5–8)
PLATELET # BLD AUTO: 247 K/UL — SIGNIFICANT CHANGE UP (ref 150–400)
POTASSIUM SERPL-MCNC: 3.9 MMOL/L — SIGNIFICANT CHANGE UP (ref 3.5–5.3)
POTASSIUM SERPL-SCNC: 3.9 MMOL/L — SIGNIFICANT CHANGE UP (ref 3.5–5.3)
PROT SERPL-MCNC: 7.5 G/DL — SIGNIFICANT CHANGE UP (ref 6–8.3)
PROT UR-MCNC: NEGATIVE — SIGNIFICANT CHANGE UP
PROTHROM AB SERPL-ACNC: 12.8 SEC — SIGNIFICANT CHANGE UP (ref 10.5–13.4)
RBC # BLD: 4.32 M/UL — SIGNIFICANT CHANGE UP (ref 3.8–5.2)
RBC # FLD: 12.1 % — SIGNIFICANT CHANGE UP (ref 10.3–14.5)
RSV RNA NPH QL NAA+NON-PROBE: SIGNIFICANT CHANGE UP
SARS-COV-2 RNA SPEC QL NAA+PROBE: SIGNIFICANT CHANGE UP
SODIUM SERPL-SCNC: 142 MMOL/L — SIGNIFICANT CHANGE UP (ref 135–145)
SP GR SPEC: 1.03 — SIGNIFICANT CHANGE UP (ref 1.01–1.05)
TROPONIN T, HIGH SENSITIVITY RESULT: <6 NG/L — SIGNIFICANT CHANGE UP
UROBILINOGEN FLD QL: SIGNIFICANT CHANGE UP
WBC # BLD: 5.16 K/UL — SIGNIFICANT CHANGE UP (ref 3.8–10.5)
WBC # FLD AUTO: 5.16 K/UL — SIGNIFICANT CHANGE UP (ref 3.8–10.5)

## 2023-02-11 PROCEDURE — 70498 CT ANGIOGRAPHY NECK: CPT | Mod: 26,MA

## 2023-02-11 PROCEDURE — 99285 EMERGENCY DEPT VISIT HI MDM: CPT

## 2023-02-11 PROCEDURE — 70496 CT ANGIOGRAPHY HEAD: CPT | Mod: 26,MA

## 2023-02-11 RX ORDER — METOCLOPRAMIDE HCL 10 MG
10 TABLET ORAL ONCE
Refills: 0 | Status: COMPLETED | OUTPATIENT
Start: 2023-02-11 | End: 2023-02-11

## 2023-02-11 RX ORDER — GABAPENTIN 400 MG/1
1 CAPSULE ORAL
Qty: 90 | Refills: 0
Start: 2023-02-11 | End: 2023-03-12

## 2023-02-11 RX ORDER — KETOROLAC TROMETHAMINE 30 MG/ML
30 SYRINGE (ML) INJECTION ONCE
Refills: 0 | Status: DISCONTINUED | OUTPATIENT
Start: 2023-02-11 | End: 2023-02-11

## 2023-02-11 RX ORDER — DIAZEPAM 5 MG
2 TABLET ORAL ONCE
Refills: 0 | Status: DISCONTINUED | OUTPATIENT
Start: 2023-02-11 | End: 2023-02-11

## 2023-02-11 RX ORDER — ACETAMINOPHEN 500 MG
650 TABLET ORAL ONCE
Refills: 0 | Status: COMPLETED | OUTPATIENT
Start: 2023-02-11 | End: 2023-02-11

## 2023-02-11 RX ADMIN — Medication 30 MILLIGRAM(S): at 13:47

## 2023-02-11 RX ADMIN — Medication 10 MILLIGRAM(S): at 08:44

## 2023-02-11 RX ADMIN — Medication 2 MILLIGRAM(S): at 08:44

## 2023-02-11 RX ADMIN — Medication 30 MILLIGRAM(S): at 13:26

## 2023-02-11 NOTE — ED ADULT TRIAGE NOTE - CODE STROKE DETAILS
left sided numbness, blurred vision, headache, diff talking, unsteady. left sided numbness, blurred vision, headache, diff talking, unsteady gait

## 2023-02-11 NOTE — ED PROVIDER NOTE - PATIENT PORTAL LINK FT
You can access the FollowMyHealth Patient Portal offered by [facility] by registering at the following website: [email or web address]. By joining [employer]’s FollowMyHealth portal, you will also be able to view your health information using other applications (apps) compatible with our system.

## 2023-02-11 NOTE — ED PROVIDER NOTE - PHYSICAL EXAMINATION
GENERAL: non-toxic appearing, alert, crying  HEENT: atraumatic, normocephalic, Vision grossly intact, no conjunctivitis or discharge, hearing grossly intact,  no nasal discharge, epistaxis   CARDIAC: RRR, normal S1S2,  no appreciable murmurs, no cyanosis, cap refill < 2 seconds  PULM: normal work of breathing, oxygen saturation on RA wnl, CTAB, no crackles, rales, rhonchi, or wheezing  GI: abdomen nondistended, soft, nontender, no guarding or rebound tenderness, no palpable masses  NEURO: awake and alert, follows commands, normal speech, PERRLA, EOMI, no focal motor or sensory deficits  MSK: spine appears normal, no joint swelling or erythema, ranging all extremities with no appreciable loss of ROM  EXT: no peripheral edema, calf tenderness, redness or swelling  SKIN: warm, diaphoretic, intact, no rashes  PSYCH: Tearful, anxious

## 2023-02-11 NOTE — ED PROVIDER NOTE - CARE PROVIDER_API CALL
Arcadio Le)  Neurology  [address], [address]  [place] [address]  Phone: [phone]  Fax: [phone]  Follow Up Time:

## 2023-02-11 NOTE — CONSULT NOTE ADULT - ASSESSMENT
INCOMPLETE     Impression: Left sided hemiparesis with numbness and tingling in left face less likely due to acute ischemia.  Could be due to migraine.     Recommendations:   [] Treat migraine with Metoclopramide 10mg IV, Benadryl 25mg IV, Tylenol 1g IV (can be given together, Q8HR PRN for HA) or MGSO4 1G IV   [] Darken/quiet room  [] Encourage avoidance of common migraine triggers (artificial sweeteners, food preservative (MSG), aged cheese, skipping meals, change in wake/sleep cycle and intense physical exertion)  [] Encourage lifestyle changes that help migraine: physical exercise, fixed meal time, fixed sleep/wake cycle, avoid smoking and highly caffeinated products   [] BP goals Permissive HTN up to 220/110 for 24-48 hours, then gradual normotension   [] PT/OT evaluations  [] Diet: NPO until passes dysphagia screen, if not speech and swallow eval    [] HgA1C goals < 7  [] Lifestyle modifications: smoking cessation, exercise, and a Mediterranean style diet.     Case discussed with  INCOMPLETE    52 year old female with past medical history of HLD, prior CVA in 5/2021 with no residual deficits, migraines, and DVTs in 2010. Presents to the ED as code stroke for L arm pain and headache.  States that she went to bed at 11pm on 2/10/23 and when she woke up at 4AM on 2/11 she had left arm pain a frontal headache with pain in the back of her neck that radiates down her left arm, and nausea. She had DVTs in 2010 for which she states were from being bedridden for peritonitis, she was on coumadin for 8months and then she stopped.  She has been taking aspirin, plavix, and a statin since her stroke in 5/21 per her neurologist at [facility] she does not remember her name.  She had a loop recorder placed after prior stroke for who she follows with cardiologist, Dr. Ware and states he has never found atrial fibrillation.  She has no prior deficits from prior stroke but states that she has not worked since this time.  Her prior stroke presented as a headache and left sided weakness.  She has a history of migraines that are frontal in nature and she has history of one sided numbness and weakness with headaches.  She normally gets a headache about once a month for which she used to take Excedrin migraine but was told from her outpatient neurologist at [facility] after the prior stroke she should not take it anymore.  She has had a headache off and on for the past few days.      Impression: Frontal headache with left sided hemiparesis with numbness and tingling in left face, most likely due to migraine and less likely due to acute ischemia.    Recommendations:   [] CTH/CTA h&n negative   [] Treat migraine with Metoclopramide 10mg IV, Benadryl 25mg IV, Tylenol 1g IV (can be given together, Q8HR PRN for HA) or MGSO4 1G IV   [] Darken/quiet room  [] Encourage avoidance of common migraine triggers (artificial sweeteners, food preservative (MSG), aged cheese, skipping meals, change in wake/sleep cycle and intense physical exertion)  [] Encourage lifestyle changes that help migraine: physical exercise, fixed meal time, fixed sleep/wake cycle, avoid smoking and highly caffeinated products   [] BP goals Permissive HTN up to 220/110 for 24-48 hours, then gradual normotension   [] PT/OT evaluations  [] Diet: NPO until passes dysphagia screen, if not speech and swallow eval    [] HgA1C goals < 7  [] Lifestyle modifications: smoking cessation, exercise, and a Mediterranean style diet.     Case discussed with  INCOMPLETE    52 year old female with past medical history of HLD, prior CVA in 5/2021 with no residual deficits, migraines, and DVTs in 2010. Presents to the ED as code stroke for L arm pain and headache.  States that she went to bed at 11pm on 2/10/23 and when she woke up at 4AM on 2/11 she had left arm pain a frontal headache with pain in the back of her neck that radiates down her left arm, and nausea. She had DVTs in 2010 for which she states were from being bedridden for peritonitis, she was on coumadin for 8months and then she stopped.  She has been taking aspirin, plavix, and a statin since her stroke in 5/21 per her neurologist at [facility] she does not remember her name.  She had a loop recorder placed after prior stroke for who she follows with cardiologist, Dr. Ware and states he has never found atrial fibrillation.  She has no prior deficits from prior stroke but states that she has not worked since this time.  Her prior stroke presented as a headache and left sided weakness.  She has a history of migraines that are frontal in nature and she has history of one sided numbness and weakness with headaches.  She normally gets a headache about once a month for which she used to take Excedrin migraine but was told from her outpatient neurologist at [facility] after the prior stroke she should not take it anymore.  She has had a headache off and on for the past few days.      Tenecteplase not given as patient outside window  Not ET candidate due to no LVO     Impression: Frontal headache with left sided hemiparesis with numbness and tingling in left face, most likely due to migraine and less likely due to acute ischemia.    Recommendations:   [] CTH/CTA h&n negative   [] Treat migraine with Metoclopramide 10mg IV, Benadryl 25mg IV, Tylenol 1g IV (can be given together, Q8HR PRN for HA) or MGSO4 1G IV   [] MRI without IV contrast if headache not resolved with medications   [] Darken/quiet room  [] Encourage avoidance of common migraine triggers (artificial sweeteners, food preservative (MSG), aged cheese, skipping meals, change in wake/sleep cycle and intense physical exertion)  [] Encourage lifestyle changes that help migraine: physical exercise, fixed meal time, fixed sleep/wake cycle, avoid smoking and highly caffeinated products   [] BP goals Permissive HTN up to 220/110 for 24-48 hours, then gradual normotension   [] PT/OT evaluations  [] Diet: NPO until passes dysphagia screen, if not speech and swallow eval    [] HgA1C goals < 7  [] Lifestyle modifications: smoking cessation, exercise, and a Mediterranean style diet.     Case discussed with  52 year old female with past medical history of HLD, prior CVA in 5/2021 with no residual deficits, migraines, and DVTs in 2010. Presents to the ED as code stroke for L arm pain and headache.  States that she went to bed at 11pm on 2/10/23 and when she woke up at 4AM on 2/11 she had left arm pain a frontal headache with pain in the back of her neck that radiates down her left arm, and nausea. She had DVTs in 2010 for which she states were from being bedridden for peritonitis, she was on coumadin for 8months and then she stopped.  She has been taking aspirin, plavix, and a statin since her stroke in 5/21 per her neurologist at [facility] she does not remember her name.  She had a loop recorder placed after prior stroke for who she follows with cardiologist, Dr. Ware and states he has never found atrial fibrillation.  She has no prior deficits from prior stroke but states that she has not worked since this time.  Her prior stroke presented as a headache and left sided weakness.  She has a history of migraines that are frontal in nature and she has history of one sided numbness and weakness with headaches.  She normally gets a headache about once a month for which she used to take Excedrin migraine but was told from her outpatient neurologist at [facility] after the prior stroke she should not take it anymore.  She has had a headache off and on for the past few days.      Tenecteplase not given as patient outside window  Not ET candidate due to no LVO     Impression: Frontal headache associated with numbness and tingling in left face, most likely due to migraine, as history of migraines with unilateral weakness and less likely due to acute ischemia.    Recommendations:   [] CTH/CTA h&n negative   [] Treat migraine with Metoclopramide 10mg IV, Benadryl 25mg IV, Toradol 30mg IV (can be given together, Q8HR PRN for HA) or MGSO4 1G IV   [] Darken/quiet room  [] Encourage avoidance of common migraine triggers (artificial sweeteners, food preservative (MSG), aged cheese, skipping meals, change in wake/sleep cycle and intense physical exertion)  [] Encourage lifestyle changes that help migraine: physical exercise, fixed meal time, fixed sleep/wake cycle, avoid smoking and highly caffeinated products   [] Normotension   [] Diet: NPO until passes dysphagia screen, if not speech and swallow eval    [] Neuro checks and vitals per unit protocol     Case to be discussed with neurology attending.   52 year old female with past medical history of HLD, prior CVA in 5/2021 with no residual deficits, migraines, and DVTs in 2010. Presents to the ED as code stroke for L arm pain and headache.  States that she went to bed at 11pm on 2/10/23 and when she woke up at 4AM on 2/11 she had left arm pain a frontal headache with pain in the back of her neck that radiates down her left arm, and nausea. She had DVTs in 2010 for which she states were from being bedridden for peritonitis, she was on coumadin for 8months and then she stopped.  She has been taking aspirin, plavix, and a statin since her stroke in 5/21 per her neurologist at [facility] she does not remember her name.  She had a loop recorder placed after prior stroke for who she follows with cardiologist, Dr. Ware and states he has never found atrial fibrillation.  She has no prior deficits from prior stroke but states that she has not worked since this time.  Her prior stroke presented as a headache and left sided weakness.  She has a history of migraines that are frontal in nature and she has history of one sided numbness and weakness with headaches.  She normally gets a headache about once a month for which she used to take Excedrin migraine but was told from her outpatient neurologist at [facility] after the prior stroke she should not take it anymore.  She has had a headache off and on for the past few days.      Tenecteplase not given as patient outside window  Not ET candidate due to no LVO     Impression: Frontal headache associated with numbness and tingling in left face, most likely due to migraine, as history of migraines with unilateral weakness and less likely due to acute ischemia.    Recommendations:   [] CTH/CTA h&n negative   [] Treat migraine with Metoclopramide 10mg IV, Benadryl 25mg IV, Toradol 30mg IV (can be given together, Q8HR PRN for HA) or MGSO4 1G IV   [] Darken/quiet room  [] Encourage avoidance of common migraine triggers (artificial sweeteners, food preservative (MSG), aged cheese, skipping meals, change in wake/sleep cycle and intense physical exertion)  [] Encourage lifestyle changes that help migraine: physical exercise, fixed meal time, fixed sleep/wake cycle, avoid smoking and highly caffeinated products   [] Normotension   [] Diet: NPO until passes dysphagia screen, if not speech and swallow eval    [] Neuro checks and vitals per unit protocol   [] Patient should follow up with Dr. Howell or Dr. Le, for migraines in clinic at [address] [place] [address]. [phone].     Case discussed with neurology attending, Dr. Hutchison.

## 2023-02-11 NOTE — ED ADULT TRIAGE NOTE - CHIEF COMPLAINT QUOTE
Pt arrives crying awoke at 5;30am with c/o left facial numbness, difficulty talking ,  left arm weakness, blurred vision., headache ,unsteady gait.. she also says her heart is racing. and has chest pain...as per  she has hx of CVA and has a loop recorder on plavix. LNW as per  was 23:00 2/10. Pt arrives crying awoke at 5;30am with c/o left facial numbness, difficulty talking ,  left arm weakness, blurred vision., headache ,unsteady gait.. she also says her heart is racing. and has chest pain...as per  she has hx of CVA and has a loop recorder on plavix. LNW as per  was 23:00 2/10.    Addendum:  contact number Andres [phone] (has small child , went home and will return)

## 2023-02-11 NOTE — ED PROVIDER NOTE - DIFFERENTIAL DIAGNOSIS
includes but not limited to migraine, stroke, tia, MS, anxiety, somatosymptomatic Differential Diagnosis

## 2023-02-11 NOTE — STROKE CODE NOTE - NIH STROKE SCALE: 6A. MOTOR LEG, LEFT, QM
(1) Drift; leg falls by the end of the 5-sec period but does not hit bed Protopic Counseling: Patient may experience a mild burning sensation during topical application. Protopic is not approved in children less than 2 years of age. There have been case reports of hematologic and skin malignancies in patients using topical calcineurin inhibitors although causality is questionable.

## 2023-02-11 NOTE — ED PROVIDER NOTE - TEST CONSIDERED BUT NOT PERFORMED
mri, if negative to CAT head or neck, given normal CTA reports last year Tests Considered But Not Performed

## 2023-02-11 NOTE — ED PROVIDER NOTE - CLINICAL SUMMARY MEDICAL DECISION MAKING FREE TEXT BOX
CC: headache, left arm pain  HPI Brief:  h.o CVA - Symptoms since 4AM, has had them intermittently over past 4 days. Similar symptoms in previous ED visits. +nausea, +L facial numbness    DDx:   -ICH (on eliquis, headache w L facial numbness and h/o CVA)  -Ischemic Stroke  -Migraine with complex features  -Cervical radiculopathy  -Carotid stenosis  -DVT    Labs/Rads:  - Stroke bundle   - Cardiac enzymes  - EKG  - COVID/Flu  - UA    Consults:  - Stroke team - Stroke code called    Fam/Collateral:     Progress:  - Pt too anxious to lay down in CT, Reglan  (headache/nausea) and valium (anxiety/back pain) given 8:30 CC: headache, left arm pain  HPI Brief:  h.o CVA - Symptoms since 4AM, has had them intermittently over past 4 days. Similar symptoms in previous ED visits. +nausea, +L facial numbness  - went to sleep at 11pm in normal health    DDx:   -ICH (on eliquis, headache w L facial numbness and h/o CVA)  -Ischemic Stroke  -Migraine with complex features  -Cervical radiculopathy  -Carotid stenosis  -DVT    Labs/Rads:  - Stroke bundle   - Cardiac enzymes  - EKG  - COVID/Flu  - UA    Consults:  - Stroke team - Stroke code called - Defer perfusion scans    Fam/Collateral:     Med decisions/Progress:  - Given h/x will pursue stroke/ICH work up as pt is moderate risk  - Pt too anxious to lay down in CT, Reglan  (headache/nausea) and valium (anxiety/back pain) given 8:30 CC: headache, left arm pain  HPI Brief:  h.o CVA - Symptoms since 4AM, has had them intermittently over past 4 days. Similar symptoms in previous ED visits. +nausea, +L facial numbness  - went to sleep at 11pm in normal health    DDx:   -ICH (on eliquis, headache w L facial numbness and h/o CVA)  -Ischemic Stroke  -Migraine with complex features  -Cervical radiculopathy  -Carotid stenosis  -DVT    Labs/Rads:  - Stroke bundle   - Cardiac enzymes  - EKG  - COVID/Flu  - UA    Consults:  - Stroke team - Stroke code called - Defer perfusion scans    Fam/Collateral:     Med decisions/Progress:  - Given h/x will pursue stroke/ICH work up as pt is moderate risk  - Pt too anxious to lay down in CT, Reglan  (headache/nausea) and valium (anxiety/back pain) given 8:30  - CT head CTA negative. HA improved however continues to have L shoulder shooting pains. Rpt physical exam with positive Spurling test for cervical radiculopathy  - Headache resolved, Will give d/c with f/u to [facility] for cervical radiculopathy and neurology for migraines

## 2023-02-11 NOTE — ED PROVIDER NOTE - NS_EDPROVIDERDISPOUSERTYPE_ED_A_ED
"Patient presents to the ED reporting abdominal pain, bloating and vomiting. Began this morning. States \"I think I have an obstruction.\" Reports history of previous obstruction.       "
Attending Attestation (For Attendings USE Only)...

## 2023-02-11 NOTE — ED ADULT TRIAGE NOTE - NSTRIAGECARE_GEN_A_ER
MD to triage Code stroke activated./Face Mask MD Ferguson  to triage Code stroke activated./Face Mask

## 2023-02-11 NOTE — ED ADULT NURSE REASSESSMENT NOTE - NS ED NURSE REASSESS COMMENT FT1
Received pt from Alexandra Foreman. Stroke activation. pt pending CT results. Will continue to monitor.

## 2023-02-11 NOTE — ED PROVIDER NOTE - OBJECTIVE STATEMENT
53yo F pmh CVA (L visual changes in May 2021, no residual deficits), DVT on plavix, Loop recorder - now presenting to ED with headache starting at 4AM this morning a/w nausea, L facial numbness and L arm pain. Pt also has upper back pain, mild. Has had similar headache/arm pain in the past. Headache woke her from sleep, severe, no visual changes. Denies any recent head trauma, new medications, long travel.

## 2023-02-11 NOTE — ED ADULT NURSE NOTE - CHIEF COMPLAINT QUOTE
Pt arrives crying awoke at 5;30am with c/o left facial numbness, difficulty talking ,  left arm weakness, blurred vision., headache ,unsteady gait.. she also says her heart is racing. and has chest pain...as per  she has hx of CVA and has a loop recorder on plavix. LNW as per  was 23:00 2/10.    Addendum:  contact number Andres [phone] (has small child , went home and will return)

## 2023-02-11 NOTE — ED ADULT NURSE NOTE - INTERVENTIONS DEFINITIONS
[place] to call system/Call bell, personal items and telephone within reach/Instruct patient to call for assistance/Non-slip footwear when patient is off stretcher/Physically safe environment: no spills, clutter or unnecessary equipment/Monitor gait and stability/Monitor for mental status changes and reorient to person, place, and time/Provide visual clues: red socks

## 2023-02-11 NOTE — CONSULT NOTE ADULT - SUBJECTIVE AND OBJECTIVE BOX
HPI: 52 year old female with past medical history of HLD, prior CVA in 5/2021 with no residual deficits, and DVTs in  on aspirin and plavix and atorvastatin  Presents to the ED as code stroke for L arm pain and headache.  She states that she went to bed at 11pm on 2/10/23 and when she woke up at 4AM on 2/11 she had left arm pain a headache and felt nauseous.  She     (Stroke only)  NIHSS: 2  MRS: 0  LKN: 11pm on 2/10/23     A 10-system ROS was performed and is negative except for those items noted above and/or in the HPI.    PAST MEDICAL & SURGICAL HISTORY:  Acid reflux  Peritonitis  History of appendectomy    FAMILY HISTORY:    SOCIAL HISTORY:   Non smoker   Occupation:   Lives with:     MEDICATIONS (HOME):  Home Medications:  omeprazole:  orally  (12 Apr 2016 11:47)    MEDICATIONS  (STANDING):  acetaminophen     Tablet .. 650 milliGRAM(s) Oral once  diazepam  Injectable 2 milliGRAM(s) IV Push Once  metoclopramide Injectable 10 milliGRAM(s) IV Push Once    MEDICATIONS  (PRN):    ALLERGIES/INTOLERANCES:  Allergies  No Known Allergies    Intolerances    VITALS & EXAMINATION:  Vital Signs Last 24 Hrs  T(C): 36.8 (11 Feb 2023 07:55), Max: 36.8 (11 Feb 2023 07:55)  T(F): 98.2 (11 Feb 2023 07:55), Max: 98.2 (11 Feb 2023 07:55)  HR: 92 (11 Feb 2023 07:55) (92 - 92)  BP: 116/87 (11 Feb 2023 07:55) (116/87 - 116/87)  RR: 18 (11 Feb 2023 07:55) (18 - 18)  SpO2: 99% (11 Feb 2023 07:55) (99% - 99%)    General:  Constitutional: Female, appears stated age, very anxious legs bouncing up and down while sitting in wheelchair.   Head: Normocephalic & Atraumatic.  Respiratory: Breathing comfortably.  Extremities: No cyanosis, clubbing, or edema    Neurological:  MS: Eyes closed, open to verbal stimuli, alert, oriented to person, place, situation, time. Follows all commands. IDs objects.     Language: Speech is hypophonic, fluent with good repetition & comprehension.    CNs: PERRL (R = 3mm, L = 3mm). VFF. EOMI no nystagmus. Decrease to LT on V2 left face. No facial asymmetry b/l, full eye closure strength b/l. Hearing grossly normal (rubbing fingers) b/l. Tongue midline, normal movements, no atrophy.     Motor: Normal muscle bulk & tone. Legs with tremors b/l sitting in wheelchair. Drift LLE.               Deltoid	Biceps	Triceps	   R	         5	             5	              5	 5	  		 	  L	         5	             5	              5	 5	  		    	H-Flex	K-Flex	K-Ext	D-Flex	P-Flex  R	    5	            5	           5	            5	           5		   L	    5	            5	           5	            5	           5		     Sensation: Decreased to LT left face.     Cortical: Extinction on DSS (neglect): none    Reflexes:              Biceps(C5)       BR(C6)     Triceps(C7)               Patellar(L4)    Achilles(S1)    Plantar Resp  R	              2	          2	             2		                              2		    2		      Down   L	              2	          2	             2		                              2		    2		      Down     Coordination:  No dysmetria to FTN b/l.     Gait: Deferred.     LABORATORY:  CBC   Chem     LFTs   Coagulopathy   Lipid Panel   A1c   Cardiac enzymes     U/A   CSF  Immunological  Other    Radiology (XR, CT, MR, U/S, TTE/GERONIMO):    CTH No cont (2/11/23):   HPI: 52 year old female with past medical history of HLD, prior CVA in 5/2021 with no residual deficits, migraines, and DVTs in 2010. Presents to the ED as code stroke for L arm pain and headache.  States that she went to bed at 11pm on 2/10/23 and when she woke up at 4AM on 2/11 she had left arm pain a frontal headache with pain in the back of her neck that radiates down her left arm, and nausea. She had DVTs in 2010 for which she states were from being bedridden for peritonitis, she was on coumadin for 8months and then she stopped.  She has been taking aspirin, plavix, and a statin since her stroke in 5/21 per her neurologist at [facility] she does not remember her name.  She had a loop recorder placed after prior stroke for who she follows with cardiologist, Dr. Ware and states he has never found atrial fibrillation.  She has no prior deficits from prior stroke but states that she has not worked since this time.  Her prior stroke presented as a headache and left sided weakness.  She has a history of migraines that are frontal in nature and she has history of one sided numbness and weakness with headaches.  She normally gets a headache about once a month for which she used to take Excedrin migraine but was told from her outpatient neurologist at [facility] after the prior stroke she should not take it anymore.  She has had a headache off and on for the past few days.      (Stroke only)  NIHSS: 2  MRS: 0  LKN: 11pm on 2/10/23     A 10-system ROS was performed and is negative except for those items noted above and/or in the HPI.    PAST MEDICAL & SURGICAL HISTORY:  Acid reflux  Peritonitis  History of appendectomy    FAMILY HISTORY:    SOCIAL HISTORY:   Non smoker, no alcohol    Doesn't work since prior stroke     MEDICATIONS (HOME):  Home Medications:  omeprazole:  orally  (12 Apr 2016 11:47)    MEDICATIONS  (STANDING):  acetaminophen     Tablet .. 650 milliGRAM(s) Oral once  diazepam  Injectable 2 milliGRAM(s) IV Push Once  metoclopramide Injectable 10 milliGRAM(s) IV Push Once    MEDICATIONS  (PRN):    ALLERGIES/INTOLERANCES:  Allergies  No Known Allergies    Intolerances    VITALS & EXAMINATION:  Vital Signs Last 24 Hrs  T(C): 36.8 (11 Feb 2023 07:55), Max: 36.8 (11 Feb 2023 07:55)  T(F): 98.2 (11 Feb 2023 07:55), Max: 98.2 (11 Feb 2023 07:55)  HR: 92 (11 Feb 2023 07:55) (92 - 92)  BP: 116/87 (11 Feb 2023 07:55) (116/87 - 116/87)  RR: 18 (11 Feb 2023 07:55) (18 - 18)  SpO2: 99% (11 Feb 2023 07:55) (99% - 99%)    General:  Constitutional: Female, appears stated age, very anxious legs bouncing up and down while sitting in wheelchair.   Head: Normocephalic & Atraumatic.  Respiratory: Breathing comfortably.  Extremities: No cyanosis, clubbing, or edema    Neurological:  MS: Eyes closed, open to verbal stimuli, alert, oriented to person, place, situation, time. Follows all commands. IDs objects.     Language: Speech is hypophonic, fluent with good repetition & comprehension.    CNs: PERRL (R = 3mm, L = 3mm). VFF. EOMI no nystagmus. Decrease to LT on V2 left face. No facial asymmetry b/l, full eye closure strength b/l. Hearing grossly normal (rubbing fingers) b/l. Tongue midline, normal movements, no atrophy.     Motor: Normal muscle bulk & tone. Legs with tremors b/l sitting in wheelchair. Drift LLE.               Deltoid	Biceps	Triceps	  R	         5	             5	              5	 5	  		 	  L	         5	             5	              5	 5	  		    	H-Flex	K-Flex	K-Ext	D-Flex	P-Flex  R	5	  5	 5	 5	    5		   L	5	  5	 5	 5	    5		     Sensation: Decreased to LT left face.     Cortical: Extinction on DSS (neglect): none    Reflexes:              Biceps(C5)       BR(C6)     Triceps(C7)               Patellar(L4)    Achilles(S1)    Plantar Resp  R	       2	          2	             2		          2		    2		      Down   L	       2	          2	             2		          2		    2		      Down     Coordination:  No dysmetria to FTN b/l.     Gait: Deferred.     LABORATORY:  CBC   Chem     LFTs   Coagulopathy   Lipid Panel   A1c   Cardiac enzymes     U/A   CSF  Immunological  Other    Radiology (XR, CT, MR, U/S, TTE/GERONIMO):    CTH No cont (2/11/23):   HPI: 52 year old female with past medical history of HLD, prior CVA in 5/2021 with no residual deficits, migraines, and DVTs in 2010. Presents to the ED as code stroke for L arm pain and headache.  States that she went to bed at 11pm on 2/10/23 and when she woke up at 4AM on 2/11 she had left arm pain a frontal headache with pain in the back of her neck that radiates down her left arm, and nausea. She had DVTs in 2010 for which she states were from being bedridden for peritonitis, she was on coumadin for 8months and then she stopped.  She has been taking aspirin, plavix, and a statin since her stroke in 5/21 per her neurologist at [facility] she does not remember her name.  She had a loop recorder placed after prior stroke for who she follows with cardiologist, Dr. Ware and states he has never found atrial fibrillation.  She has no prior deficits from prior stroke but states that she has not worked since this time.  Her prior stroke presented as a headache and left sided weakness.  She has a history of migraines that are frontal in nature and she has history of one sided numbness and weakness with headaches.  She normally gets a headache about once a month for which she used to take Excedrin migraine but was told from her outpatient neurologist at [facility] after the prior stroke she should not take it anymore.  She has had a headache off and on for the past few days.      (Stroke only)  NIHSS: 2  MRS: 0  LKN: 11pm on 2/10/23     A 10-system ROS was performed and is negative except for those items noted above and/or in the HPI.    PAST MEDICAL & SURGICAL HISTORY:  Acid reflux  Peritonitis  History of appendectomy    FAMILY HISTORY:    SOCIAL HISTORY:   Non smoker, no alcohol    Doesn't work since prior stroke     MEDICATIONS (HOME):  Home Medications:  omeprazole:  orally  (12 Apr 2016 11:47)    MEDICATIONS  (STANDING):  acetaminophen     Tablet .. 650 milliGRAM(s) Oral once  diazepam  Injectable 2 milliGRAM(s) IV Push Once  metoclopramide Injectable 10 milliGRAM(s) IV Push Once    MEDICATIONS  (PRN):    ALLERGIES/INTOLERANCES:  Allergies  No Known Allergies    Intolerances    VITALS & EXAMINATION:  Vital Signs Last 24 Hrs  T(C): 36.8 (11 Feb 2023 07:55), Max: 36.8 (11 Feb 2023 07:55)  T(F): 98.2 (11 Feb 2023 07:55), Max: 98.2 (11 Feb 2023 07:55)  HR: 92 (11 Feb 2023 07:55) (92 - 92)  BP: 116/87 (11 Feb 2023 07:55) (116/87 - 116/87)  RR: 18 (11 Feb 2023 07:55) (18 - 18)  SpO2: 99% (11 Feb 2023 07:55) (99% - 99%)    General:  Constitutional: Female, appears stated age, very anxious legs bouncing up and down while sitting in wheelchair.   Head: Normocephalic & Atraumatic.  Respiratory: Breathing comfortably.  Extremities: No cyanosis, clubbing, or edema    Neurological:  MS: Eyes closed, open to verbal stimuli, alert, oriented to person, place, situation, time. Follows all commands. IDs objects.     Language: Speech is hypophonic, fluent with good repetition & comprehension.    CNs: PERRL (R = 3mm, L = 3mm). VFF. EOMI no nystagmus. Decrease to LT on V2 left face. No facial asymmetry b/l, full eye closure strength b/l. Hearing grossly normal (rubbing fingers) b/l. Tongue midline, normal movements, no atrophy.     Motor: Normal muscle bulk & tone. Legs with tremors b/l sitting in wheelchair. Drift LLE.               Deltoid	Biceps	Triceps	  R	         5	             5	              5	 5	  		 	  L	         5	             5	              5	 5	  		    	H-Flex	K-Flex	K-Ext	D-Flex	P-Flex  R	5	  5	 5	 5	    5		   L	5	  5	 5	 5	    5		     Sensation: Decreased to LT left face.     Cortical: Extinction on DSS (neglect): none    Reflexes:              Biceps(C5)       BR(C6)     Triceps(C7)               Patellar(L4)    Achilles(S1)    Plantar Resp  R	       2	          2	             2		          2		    2		      Down   L	       2	          2	             2		          2		    2		      Down     Coordination:  No dysmetria to FTN b/l.     Gait: Deferred.     LABORATORY:  CBC   Chem     LFTs   Coagulopathy   Lipid Panel   A1c   Cardiac enzymes     U/A   CSF  Immunological  Other    Radiology (XR, CT, MR, U/S, TTE/GERONIMO):    CTH No cont (2/11/23):  No CT evidence of acute intracranial hemorrhage, brain edema, or mass effect.    CTA Head and Neck w/ IV cont (2/11/23):  CTA BRAIN: Patent intracranial circulation. No flow-limiting stenosis or occlusion.    CTA NECK: Patent cervical vasculature. No flow limiting stenosis or   occlusion.   HPI: 52 year old female with past medical history of HLD, prior CVA in 5/2021 with no residual deficits, migraines, and DVTs in 2010. Presents to the ED as code stroke for L arm pain and headache.  States that she went to bed at 11pm on 2/10/23 and when she woke up at 4AM on 2/11 she had left arm pain a frontal headache with pain in the back of her neck that radiates down her left arm, and nausea. She had DVTs in 2010 for which she states were from being bedridden for peritonitis, she was on coumadin for 8months and then she stopped.  She has been taking aspirin, plavix, and a statin since her stroke in 5/21 per her neurologist at [facility] she does not remember her name.  She had a loop recorder placed after prior stroke for who she follows with cardiologist, Dr. Ware and states he has never found atrial fibrillation.  She has no prior deficits from prior stroke but states that she has not worked since this time.  Her prior stroke presented as a headache and left sided weakness.  She has a history of migraines that are frontal in nature and she has history of one sided numbness and weakness with headaches.  She normally gets a headache about once a month for which she used to take Excedrin migraine but was told from her outpatient neurologist at [facility] after the prior stroke she should not take it anymore.  She has had a headache off and on for the past few days.      (Stroke only)  NIHSS: 2  MRS: 0  LKN: 11pm on 2/10/23     A 10-system ROS was performed and is negative except for those items noted above and/or in the HPI.    PAST MEDICAL & SURGICAL HISTORY:  Acid reflux  Peritonitis  History of appendectomy    FAMILY HISTORY:    SOCIAL HISTORY:   Non smoker, no alcohol    Doesn't work since prior stroke     MEDICATIONS (HOME):  Home Medications:  omeprazole:  orally  (12 Apr 2016 11:47)    MEDICATIONS  (STANDING):  acetaminophen     Tablet .. 650 milliGRAM(s) Oral once  diazepam  Injectable 2 milliGRAM(s) IV Push Once  metoclopramide Injectable 10 milliGRAM(s) IV Push Once    MEDICATIONS  (PRN):    ALLERGIES/INTOLERANCES:  Allergies  No Known Allergies    Intolerances    VITALS & EXAMINATION:  Vital Signs Last 24 Hrs  T(C): 36.8 (11 Feb 2023 07:55), Max: 36.8 (11 Feb 2023 07:55)  T(F): 98.2 (11 Feb 2023 07:55), Max: 98.2 (11 Feb 2023 07:55)  HR: 92 (11 Feb 2023 07:55) (92 - 92)  BP: 116/87 (11 Feb 2023 07:55) (116/87 - 116/87)  RR: 18 (11 Feb 2023 07:55) (18 - 18)  SpO2: 99% (11 Feb 2023 07:55) (99% - 99%)    General:  Constitutional: Female, appears stated age, very anxious legs bouncing up and down while sitting in wheelchair.   Head: Normocephalic & Atraumatic.  Respiratory: Breathing comfortably.  Extremities: No cyanosis, clubbing, or edema    Neurological:  MS: Eyes closed, open to verbal stimuli, alert, oriented to person, place, situation, time. Follows all commands. IDs objects.     Language: Speech is hypophonic, fluent with good repetition & comprehension.    CNs: PERRL (R = 3mm, L = 3mm). VFF. EOMI no nystagmus. Decrease to LT on V2 left face. No facial asymmetry b/l, full eye closure strength b/l. Hearing grossly normal (rubbing fingers) b/l. Tongue midline, normal movements, no atrophy.     Motor: Normal muscle bulk & tone. Legs with tremors b/l sitting in wheelchair. Drift LLE.       	   Sensation: Decreased to LT left face.     Cortical: Extinction on DSS (neglect): none    Coordination:  No dysmetria to FTN b/l.     Gait: Deferred.     LABORATORY:  CBC   Chem     LFTs   Coagulopathy   Lipid Panel   A1c   Cardiac enzymes     U/A   CSF  Immunological  Other    Radiology (XR, CT, MR, U/S, TTE/GERONIMO):    CTH No cont (2/11/23):  No CT evidence of acute intracranial hemorrhage, brain edema, or mass effect.    CTA Head and Neck w/ IV cont (2/11/23):  CTA BRAIN: Patent intracranial circulation. No flow-limiting stenosis or occlusion.    CTA NECK: Patent cervical vasculature. No flow limiting stenosis or   occlusion.

## 2023-02-11 NOTE — ED PROVIDER NOTE - PROGRESS NOTE DETAILS
Attending/MD Marty. pt feels better, cta negative to occlusion or blockage, given the past CVA was [facility]? 2/2 leg dvt, might be thrombosis that was resolved currently, this is likely migraine, in order to prevent repetitive ct or stroke work up, pt needs solid neurologist that she does not have from [facility], which we will refer her to, dc with out pt migraine neurologist follow up and additinally cardiolgist for cardiac echo, I have given the pt strict return and follow up precautions. The patient has been provided with a copy of all pertinent results. The patient has been informed of all concerning signs and symptoms to return to Emergency Department, the necessity to follow up with PMD/Clinic/follow up provided within 2-3 days was explained, and the patient reports understanding of above with capacity and insight.

## 2023-02-11 NOTE — ED PROVIDER NOTE - NSFOLLOWUPINSTRUCTIONS_ED_ALL_ED_FT
Headache    A headache is pain or discomfort felt around the head or neck area. The specific cause of a headache may not be found as there are many types including tension headaches, migraine headaches, and cluster headaches. Watch your condition for any changes. Things you can do to manage your pain include taking over the counter and prescription medications as instructed by your health care provider, lying down in a dark quiet room, limiting stress, getting regular sleep, and refraining from alcohol and tobacco products.    SEEK IMMEDIATE MEDICAL CARE IF YOU HAVE ANY OF THE FOLLOWING SYMPTOMS: fever, vomiting, stiff neck, loss of vision, problems with speech, muscle weakness, loss of balance, trouble walking, passing out, or confusion. You were seen and evaluated today for symptoms likely related to migraine disorder and cervical radiculopathy (pinched nerve in the neck)    Your evaluation today included blood work EKG and CT scans    A prescription was sent to your pharmacy for gabapentin/Neurontin.  Please take as prescribed.  This medication needs to build up in your system before you see the effects of it.  This medication can make you sleepy please do not drive while taking it.    Please follow-up with neurology to manage migraines and neurosurgery to talk about the shooting pains in your left arm and neck.    Please return to the Emergency Department should you experience any of the following:  - Chest pain, Palpitations, Painful breathing  - Worsening or persistent shortness of breath  - Fever, unexplained weight loss, night sweats  - Blood in stool or in urine  - New weakness, fatigue, or fainting  - Any new concerning symptoms

## 2023-02-11 NOTE — ED ADULT NURSE NOTE - OBJECTIVE STATEMENT
Receive report from Gissell:  Pt ic CT awaiting meds.  IV 20 G RAC noted. Labs sent by RN.  Pt is alert and oriented x 4 , cryiing and anxious c/o headache since 4am, L sided numbness, L arm pain,  and unsteady gait,  Denies chest pain, sob, dizziness, cough slurred speech.  No facial droop, weakness noted. Resp unlabored.  Valium 2 mg given for CT. Report given to Dorie MENG Receive report from Gissell:  Pt ic CT awaiting meds.  IV 20 G RAC noted. Labs sent by RN.  Pt is alert and oriented x 4 , cryiing and anxious c/o headache since 4am, L sided numbness, L arm pain,  and unsteady gait,  Denies chest pain, sob, dizziness, cough slurred speech.  No facial droop, weakness noted. Resp unlabored.  Valium 2 mg given for CT.  Pt with hx of CVA residual L viisual change;  and DVT on plavix. ; and  loop recorder. Report given to Dorie MENG

## 2023-02-11 NOTE — ED PROVIDER NOTE - NSPTACCESSSVCSAPPTDETAILS_ED_ALL_ED_FT
Pt needs 1-2 wk f/u with neurosurgery for cervical neuropathy  Pt needs 1-2 wk f/u with neurology for migraine disorder  with Dr. Howell or Dr. Le, for migraines in clinic at [address] [place] [address]. [phone].

## 2023-02-14 ENCOUNTER — NON-APPOINTMENT (OUTPATIENT)
Age: 53
End: 2023-02-14

## 2023-02-16 ENCOUNTER — APPOINTMENT (OUTPATIENT)
Dept: CT IMAGING | Facility: IMAGING CENTER | Age: 53
End: 2023-02-16

## 2023-03-09 ENCOUNTER — APPOINTMENT (OUTPATIENT)
Dept: GASTROENTEROLOGY | Facility: CLINIC | Age: 53
End: 2023-03-09

## 2024-05-14 ENCOUNTER — APPOINTMENT (OUTPATIENT)
Dept: PULMONOLOGY | Facility: CLINIC | Age: 54
End: 2024-05-14
Payer: COMMERCIAL

## 2024-05-14 VITALS
OXYGEN SATURATION: 98 % | TEMPERATURE: 98 F | BODY MASS INDEX: 27.82 KG/M2 | HEART RATE: 71 BPM | WEIGHT: 157 LBS | SYSTOLIC BLOOD PRESSURE: 107 MMHG | DIASTOLIC BLOOD PRESSURE: 71 MMHG | HEIGHT: 63 IN

## 2024-05-14 DIAGNOSIS — Z87.891 PERSONAL HISTORY OF NICOTINE DEPENDENCE: ICD-10-CM

## 2024-05-14 DIAGNOSIS — R06.00 DYSPNEA, UNSPECIFIED: ICD-10-CM

## 2024-05-14 PROCEDURE — 36415 COLL VENOUS BLD VENIPUNCTURE: CPT

## 2024-05-14 PROCEDURE — 99204 OFFICE O/P NEW MOD 45 MIN: CPT | Mod: 25

## 2024-05-14 PROCEDURE — 71046 X-RAY EXAM CHEST 2 VIEWS: CPT

## 2024-05-14 RX ORDER — PANTOPRAZOLE 40 MG/1
40 TABLET, DELAYED RELEASE ORAL DAILY
Qty: 1 | Refills: 3 | Status: DISCONTINUED | COMMUNITY
Start: 2023-02-06 | End: 2024-05-14

## 2024-05-14 RX ORDER — FAMOTIDINE 20 MG/1
20 TABLET, FILM COATED ORAL
Qty: 1 | Refills: 3 | Status: DISCONTINUED | COMMUNITY
Start: 2023-02-06 | End: 2024-05-14

## 2024-05-14 NOTE — PROCEDURE
[FreeTextEntry1] : PA and lateral chest xray performed using standard projections. Bones and soft tissues structures are unremarkable.Cardiac silhouette appears normal. Lung fields are clear. No infiltrate or masses noted. Mediastinal contours are normal. IMPRESSION: no acute cardiopulmonary disease  the labs were drawn in the office today

## 2024-05-14 NOTE — HISTORY OF PRESENT ILLNESS
[TextBox_4] : SAUL DE JESUS is a 53 year old female who presents with dyspnea PMH: CVA? in 2021 at [facility] on plavix, has loop recorder, history of dvt june 2010 as on coumadin  history of peritonitis/ in 2010  dyspnea at rest has cardio f/u at [facility]  not on PPI/ h2 blocker

## 2024-05-15 LAB
ANION GAP SERPL CALC-SCNC: 10 MMOL/L
BASOPHILS # BLD AUTO: 0.02 K/UL
BASOPHILS NFR BLD AUTO: 0.3 %
BUN SERPL-MCNC: 17 MG/DL
CALCIUM SERPL-MCNC: 8.9 MG/DL
CHLORIDE SERPL-SCNC: 104 MMOL/L
CO2 SERPL-SCNC: 27 MMOL/L
CREAT SERPL-MCNC: 0.87 MG/DL
DEPRECATED D DIMER PPP IA-ACNC: 258 NG/ML DDU
EGFR: 79 ML/MIN/1.73M2
EOSINOPHIL # BLD AUTO: 0.23 K/UL
EOSINOPHIL NFR BLD AUTO: 3.6 %
GLUCOSE SERPL-MCNC: 97 MG/DL
HCT VFR BLD CALC: 40.1 %
HGB BLD-MCNC: 12.8 G/DL
IMM GRANULOCYTES NFR BLD AUTO: 0.3 %
LYMPHOCYTES # BLD AUTO: 2.3 K/UL
LYMPHOCYTES NFR BLD AUTO: 36.5 %
MAN DIFF?: NORMAL
MCHC RBC-ENTMCNC: 29.8 PG
MCHC RBC-ENTMCNC: 31.9 GM/DL
MCV RBC AUTO: 93.3 FL
MONOCYTES # BLD AUTO: 0.48 K/UL
MONOCYTES NFR BLD AUTO: 7.6 %
NEUTROPHILS # BLD AUTO: 3.26 K/UL
NEUTROPHILS NFR BLD AUTO: 51.7 %
PLATELET # BLD AUTO: 282 K/UL
POTASSIUM SERPL-SCNC: 3.8 MMOL/L
RBC # BLD: 4.3 M/UL
RBC # FLD: 11.9 %
SODIUM SERPL-SCNC: 141 MMOL/L
WBC # FLD AUTO: 6.31 K/UL

## 2024-05-29 ENCOUNTER — APPOINTMENT (OUTPATIENT)
Dept: CT IMAGING | Facility: CLINIC | Age: 54
End: 2024-05-29

## 2024-06-11 ENCOUNTER — APPOINTMENT (OUTPATIENT)
Dept: PULMONOLOGY | Facility: CLINIC | Age: 54
End: 2024-06-11

## 2025-04-29 ENCOUNTER — NON-APPOINTMENT (OUTPATIENT)
Age: 55
End: 2025-04-29

## 2025-04-30 ENCOUNTER — APPOINTMENT (OUTPATIENT)
Dept: GASTROENTEROLOGY | Facility: CLINIC | Age: 55
End: 2025-04-30
Payer: MEDICAID

## 2025-04-30 VITALS
DIASTOLIC BLOOD PRESSURE: 74 MMHG | HEIGHT: 63 IN | TEMPERATURE: 98.3 F | RESPIRATION RATE: 15 BRPM | OXYGEN SATURATION: 97 % | SYSTOLIC BLOOD PRESSURE: 115 MMHG | BODY MASS INDEX: 27.11 KG/M2 | HEART RATE: 74 BPM | WEIGHT: 153 LBS

## 2025-04-30 DIAGNOSIS — R10.84 GENERALIZED ABDOMINAL PAIN: ICD-10-CM

## 2025-04-30 DIAGNOSIS — R14.0 ABDOMINAL DISTENSION (GASEOUS): ICD-10-CM

## 2025-04-30 DIAGNOSIS — Z87.11 PERSONAL HISTORY OF PEPTIC ULCER DISEASE: ICD-10-CM

## 2025-04-30 DIAGNOSIS — Z12.11 ENCOUNTER FOR SCREENING FOR MALIGNANT NEOPLASM OF COLON: ICD-10-CM

## 2025-04-30 DIAGNOSIS — K29.50 UNSPECIFIED CHRONIC GASTRITIS W/OUT BLEEDING: ICD-10-CM

## 2025-04-30 DIAGNOSIS — K21.9 GASTRO-ESOPHAGEAL REFLUX DISEASE W/OUT ESOPHAGITIS: ICD-10-CM

## 2025-04-30 DIAGNOSIS — Z80.0 FAMILY HISTORY OF MALIGNANT NEOPLASM OF DIGESTIVE ORGANS: ICD-10-CM

## 2025-04-30 PROCEDURE — 99214 OFFICE O/P EST MOD 30 MIN: CPT

## 2025-04-30 PROCEDURE — G2211 COMPLEX E/M VISIT ADD ON: CPT | Mod: NC

## 2025-04-30 RX ORDER — SODIUM SULFATE, POTASSIUM SULFATE AND MAGNESIUM SULFATE 1.6; 3.13; 17.5 G/177ML; G/177ML; G/177ML
17.5-3.13-1.6 SOLUTION ORAL
Qty: 354 | Refills: 0 | Status: ACTIVE | COMMUNITY
Start: 2025-04-30 | End: 1900-01-01

## 2025-05-15 ENCOUNTER — LABORATORY RESULT (OUTPATIENT)
Age: 55
End: 2025-05-15

## 2025-05-15 ENCOUNTER — APPOINTMENT (OUTPATIENT)
Dept: CT IMAGING | Facility: CLINIC | Age: 55
End: 2025-05-15

## 2025-05-16 ENCOUNTER — TRANSCRIPTION ENCOUNTER (OUTPATIENT)
Age: 55
End: 2025-05-16

## 2025-06-14 ENCOUNTER — LABORATORY RESULT (OUTPATIENT)
Age: 55
End: 2025-06-14

## 2025-06-16 ENCOUNTER — RESULT REVIEW (OUTPATIENT)
Age: 55
End: 2025-06-16

## 2025-06-21 ENCOUNTER — OUTPATIENT (OUTPATIENT)
Dept: OUTPATIENT SERVICES | Facility: HOSPITAL | Age: 55
LOS: 1 days | End: 2025-06-21
Payer: MEDICAID

## 2025-06-21 ENCOUNTER — APPOINTMENT (OUTPATIENT)
Dept: CT IMAGING | Facility: IMAGING CENTER | Age: 55
End: 2025-06-21
Payer: MEDICAID

## 2025-06-21 DIAGNOSIS — Z90.49 ACQUIRED ABSENCE OF OTHER SPECIFIED PARTS OF DIGESTIVE TRACT: Chronic | ICD-10-CM

## 2025-06-21 DIAGNOSIS — R14.0 ABDOMINAL DISTENSION (GASEOUS): ICD-10-CM

## 2025-06-21 PROCEDURE — 74177 CT ABD & PELVIS W/CONTRAST: CPT

## 2025-06-21 PROCEDURE — 74177 CT ABD & PELVIS W/CONTRAST: CPT | Mod: 26

## 2025-06-30 ENCOUNTER — APPOINTMENT (OUTPATIENT)
Dept: GASTROENTEROLOGY | Facility: AMBULATORY MEDICAL SERVICES | Age: 55
End: 2025-06-30
Payer: MEDICAID

## 2025-06-30 PROCEDURE — 43239 EGD BIOPSY SINGLE/MULTIPLE: CPT | Mod: 59

## 2025-06-30 PROCEDURE — 45378 DIAGNOSTIC COLONOSCOPY: CPT | Mod: 33

## 2025-07-23 ENCOUNTER — APPOINTMENT (OUTPATIENT)
Dept: GASTROENTEROLOGY | Facility: CLINIC | Age: 55
End: 2025-07-23
Payer: MEDICAID

## 2025-07-23 DIAGNOSIS — K21.9 GASTRO-ESOPHAGEAL REFLUX DISEASE W/OUT ESOPHAGITIS: ICD-10-CM

## 2025-07-23 DIAGNOSIS — R10.84 GENERALIZED ABDOMINAL PAIN: ICD-10-CM

## 2025-07-23 DIAGNOSIS — K29.50 UNSPECIFIED CHRONIC GASTRITIS W/OUT BLEEDING: ICD-10-CM

## 2025-07-23 DIAGNOSIS — R06.00 DYSPNEA, UNSPECIFIED: ICD-10-CM

## 2025-07-23 DIAGNOSIS — R14.0 ABDOMINAL DISTENSION (GASEOUS): ICD-10-CM

## 2025-07-23 DIAGNOSIS — Z87.11 PERSONAL HISTORY OF PEPTIC ULCER DISEASE: ICD-10-CM

## 2025-07-23 PROCEDURE — 99214 OFFICE O/P EST MOD 30 MIN: CPT | Mod: 93
